# Patient Record
Sex: FEMALE | Race: WHITE | Employment: FULL TIME | URBAN - METROPOLITAN AREA
[De-identification: names, ages, dates, MRNs, and addresses within clinical notes are randomized per-mention and may not be internally consistent; named-entity substitution may affect disease eponyms.]

---

## 2017-01-20 ENCOUNTER — OFFICE VISIT (OUTPATIENT)
Dept: OPTOMETRY | Facility: CLINIC | Age: 52
End: 2017-01-20

## 2017-01-20 DIAGNOSIS — H52.13 MYOPIA, BILATERAL: Primary | ICD-10-CM

## 2017-01-20 PROCEDURE — 92015 DETERMINE REFRACTIVE STATE: CPT | Performed by: OPTOMETRIST

## 2017-01-20 PROCEDURE — 92012 INTRM OPH EXAM EST PATIENT: CPT | Performed by: OPTOMETRIST

## 2017-01-20 NOTE — PROGRESS NOTES
Neeta Walls is a 47year old female. HPI:     HPI     Patient is in for an annual contact lens exam.Patient had issues with red irritated eyes while wearing contacts and sleeping in them in November of last year.  Stopped overwear and redness and i Pupils      Pupils   Right PERRL   Left PERRL         Visual Fields      Left Right   Result Full Full         Extraocular Movement      Right Left   Result Full, Ortho Full, Ortho         Neuro/Psych     Oriented x3:  Yes    Mood/Affect:  Normal 180   Left Acuvue Oasys for Astigmatism 8.60 14.5 -5.00 -1.75 180       Expiration Date:  1/21/2018                 ASSESSMENT/PLAN:     Diagnoses and Plan:     Myopia  New glasses and contact lens RX given today.       No orders of the defined types were p

## 2017-03-27 ENCOUNTER — NURSE NAVIGATOR ENCOUNTER (OUTPATIENT)
Dept: HEMATOLOGY/ONCOLOGY | Facility: HOSPITAL | Age: 52
End: 2017-03-27

## 2017-03-27 NOTE — PROGRESS NOTES
Navigator returned missed phone call from patient. Navigator discussed November 2016 attempt at stereotactic biopsy and recommendations for follow-up with Dr. Lexi Monroe and/or 6 month follow-up imaging (Left Diagnostic Mammo).   Patient stated she wishes to f

## 2017-03-28 ENCOUNTER — TELEPHONE (OUTPATIENT)
Dept: INTERNAL MEDICINE CLINIC | Facility: CLINIC | Age: 52
End: 2017-03-28

## 2017-03-28 DIAGNOSIS — R92.8 ABNORMAL MAMMOGRAM: Primary | ICD-10-CM

## 2017-03-28 NOTE — TELEPHONE ENCOUNTER
----- Message from Sabrina Gtz RN sent at 3/27/2017  8:13 AM CDT -----  Good morning,     Your patient, Vangie Goldberg, was recommended for a surgical consultation and follow-up diagnostic imaging from her November 2016 breast imaging and unsuccessful biop

## 2017-03-31 NOTE — TELEPHONE ENCOUNTER
Talked to Jeronimo Pizano in the breast center. Order placed. We do not need a referral for surgical consultation for patient is PPO. Jeronimo Pizano stated will contact the patient.

## 2017-05-15 ENCOUNTER — NURSE NAVIGATOR ENCOUNTER (OUTPATIENT)
Dept: HEMATOLOGY/ONCOLOGY | Facility: HOSPITAL | Age: 52
End: 2017-05-15

## 2017-05-15 NOTE — PROGRESS NOTES
Message left on machine for patient to contact Navigator.   (In regards to patient's order from Dr. Praveen Fernandez for 6 month follow-up imaging.)

## 2017-05-16 ENCOUNTER — NURSE NAVIGATOR ENCOUNTER (OUTPATIENT)
Dept: HEMATOLOGY/ONCOLOGY | Facility: HOSPITAL | Age: 52
End: 2017-05-16

## 2017-05-16 NOTE — PROGRESS NOTES
Patient called Navigator back and left message. Navigator returning patient's phone call. Discussed Dr. Ashley Camara order for 6 month follow-up mammogram.  Patient wishes to schedule appointment today.   Patient transferred to Central Scheduling to make imag

## 2017-05-19 ENCOUNTER — HOSPITAL ENCOUNTER (OUTPATIENT)
Dept: MAMMOGRAPHY | Facility: HOSPITAL | Age: 52
Discharge: HOME | End: 2017-05-19
Attending: INTERNAL MEDICINE
Payer: COMMERCIAL

## 2017-05-19 DIAGNOSIS — R92.8 ABNORMAL MAMMOGRAM: ICD-10-CM

## 2017-05-19 PROCEDURE — 77065 DX MAMMO INCL CAD UNI: CPT | Performed by: INTERNAL MEDICINE

## 2017-05-22 ENCOUNTER — NURSE NAVIGATOR ENCOUNTER (OUTPATIENT)
Dept: HEMATOLOGY/ONCOLOGY | Facility: HOSPITAL | Age: 52
End: 2017-05-22

## 2017-05-24 ENCOUNTER — OFFICE VISIT (OUTPATIENT)
Dept: OBGYN CLINIC | Facility: CLINIC | Age: 52
End: 2017-05-24

## 2017-05-24 VITALS
SYSTOLIC BLOOD PRESSURE: 103 MMHG | BODY MASS INDEX: 21.44 KG/M2 | DIASTOLIC BLOOD PRESSURE: 69 MMHG | HEIGHT: 66.5 IN | WEIGHT: 135 LBS | HEART RATE: 70 BPM

## 2017-05-24 DIAGNOSIS — R68.82 DECREASED LIBIDO: ICD-10-CM

## 2017-05-24 DIAGNOSIS — N94.10 DYSPAREUNIA, FEMALE: ICD-10-CM

## 2017-05-24 DIAGNOSIS — Z01.419 ENCOUNTER FOR GYNECOLOGICAL EXAMINATION WITHOUT ABNORMAL FINDING: Primary | ICD-10-CM

## 2017-05-24 DIAGNOSIS — N89.8 VAGINAL DISCHARGE: ICD-10-CM

## 2017-05-24 PROCEDURE — 99396 PREV VISIT EST AGE 40-64: CPT | Performed by: OBSTETRICS & GYNECOLOGY

## 2017-05-24 PROCEDURE — 99213 OFFICE O/P EST LOW 20 MIN: CPT | Performed by: OBSTETRICS & GYNECOLOGY

## 2017-05-24 NOTE — PROGRESS NOTES
Sharon Benavides is a 47year old female [id number] Patient's last menstrual period was 11/25/2014 (exact date).  Patient presents with:  Gyn Exam: ANNUAL EXAM / VAGINAL DISCHARGE -- last seen 12 yrs ago w/ daughter's pregnancy -- states takes appt w/ whomeve Problem Relation Age of Onset   • Heart Disorder Father    • Heart Disorder Mother    • Heart Disease Mother      per NG: cad   • Diabetes Mother    • Heart Disease Maternal Grandfather 36     per NG: CAD   • Glaucoma Neg    • Breast Cancer Maternal Aunt without lesions, (+) yellow discharge  Cervix:  Normal without tenderness on motion  Uterus: normal in size, contour, position, mobility, without tenderness  Adnexa: normal without masses or tenderness  Perineum: normal  Anus: no hemorroids     Assessment

## 2017-05-24 NOTE — PROGRESS NOTES
Patient called Navigator back. Pt requesting to make appointment with Dr. Miley Pradhan. Coordinated/confirmed appointment with Dr. Miley Pradhan on 6/2/17 at 1215pm.  Pt acknowledged, Dr. Rossi Diaz address given to patient.   Pt encouraged to call Navigator for any fut

## 2017-06-16 ENCOUNTER — OFFICE VISIT (OUTPATIENT)
Dept: SURGERY | Facility: CLINIC | Age: 52
End: 2017-06-16

## 2017-06-16 VITALS
WEIGHT: 131.19 LBS | SYSTOLIC BLOOD PRESSURE: 93 MMHG | DIASTOLIC BLOOD PRESSURE: 60 MMHG | HEART RATE: 73 BPM | RESPIRATION RATE: 18 BRPM | TEMPERATURE: 97 F | BODY MASS INDEX: 21 KG/M2 | OXYGEN SATURATION: 98 %

## 2017-06-16 DIAGNOSIS — Z01.818 PRE-OP TESTING: ICD-10-CM

## 2017-06-16 DIAGNOSIS — R92.1 CALCIFICATION OF LEFT BREAST: Primary | ICD-10-CM

## 2017-06-16 PROCEDURE — 99205 OFFICE O/P NEW HI 60 MIN: CPT | Performed by: SURGERY

## 2017-06-16 NOTE — PROGRESS NOTES
Pt given pre-op instructions for surgery. All questions answered. Surgery scheduler will call patient to schedule surgery at [facility]. Pt agreed and understood.        Education Record    Learner:  Patient    Disease / Diagnosis:     Adilia / Willa Dawkins

## 2017-06-19 NOTE — PROGRESS NOTES
Breast Surgery New Patient Consultation    This is the first visit for this 47year old woman, referred by Dr. Sharyle Mealing, who presents for evaluation of abnormal imaging.     History of Present Illness:   Ms. Sharon Benavides is a 47year old woman who presen current outpatient prescriptions on file prior to visit. No current facility-administered medications on file prior to visit.     Allergies:    No Known Allergies    Family History:   Family History   Problem Relation Age of Onset   • Heart Disorder Father swallowing, reflux symptoms, vomiting, dark or bloody stools, constipation, yellowing of the skin, indigestion, nausea, change in bowel habits, diarrhea, abdominal pain or vomiting blood.      Genitourinary:  The patient denies frequent urination, needing t masses. The trachea is in the midline. Conjunctiva are clear, non-icteric. Chest: The chest expands symmetrically. The lungs are clear to auscultation. Heart: The rhythm is regular. There are no murmurs, rubs, gallops or thrills.     Breasts:  Her br procedure were explained to the patient and she understood and agreed to the proposed plan. She was given ample opportunity for questions and those questions were answered to her satisfaction.  I explained that the subsequent plan will be dictated by the collins

## 2017-07-12 ENCOUNTER — TELEPHONE (OUTPATIENT)
Dept: OPTOMETRY | Facility: CLINIC | Age: 52
End: 2017-07-12

## 2017-07-24 ENCOUNTER — HOSPITAL ENCOUNTER (OUTPATIENT)
Dept: MAMMOGRAPHY | Facility: HOSPITAL | Age: 52
Discharge: HOME | End: 2017-07-24
Attending: SURGERY
Payer: COMMERCIAL

## 2017-07-24 DIAGNOSIS — R92.0 ABNORMAL FINDING ON MAMMOGRAPHY, MICROCALCIFICATION: ICD-10-CM

## 2017-07-24 NOTE — IMAGING NOTE
Note cancelled as pt was incorrectly placed in the rad schedule. Wrong date. ..should have been on the 08/24 schedule

## 2017-08-15 ENCOUNTER — TELEPHONE (OUTPATIENT)
Dept: OBGYN CLINIC | Facility: CLINIC | Age: 52
End: 2017-08-15

## 2017-08-15 DIAGNOSIS — R30.0 BURNING WITH URINATION: Primary | ICD-10-CM

## 2017-08-15 NOTE — TELEPHONE ENCOUNTER
Pt reports burning over the summer that comes and goes. Pt's father-in-law prescribed an antibiotic that improved burning but did not clear it completely. Pt has not done UA for symptoms. Pt cannot state if burning is from urethra or vagina.  Pt denies urin

## 2017-08-17 ENCOUNTER — TELEPHONE (OUTPATIENT)
Dept: OBGYN CLINIC | Facility: CLINIC | Age: 52
End: 2017-08-17

## 2017-08-17 ENCOUNTER — OFFICE VISIT (OUTPATIENT)
Dept: OBGYN CLINIC | Facility: CLINIC | Age: 52
End: 2017-08-17

## 2017-08-17 VITALS
HEART RATE: 82 BPM | BODY MASS INDEX: 21 KG/M2 | WEIGHT: 132 LBS | DIASTOLIC BLOOD PRESSURE: 59 MMHG | SYSTOLIC BLOOD PRESSURE: 93 MMHG

## 2017-08-17 DIAGNOSIS — R68.82 DECREASED LIBIDO: ICD-10-CM

## 2017-08-17 DIAGNOSIS — N94.10 DYSPAREUNIA, FEMALE: Primary | ICD-10-CM

## 2017-08-17 PROCEDURE — 99213 OFFICE O/P EST LOW 20 MIN: CPT | Performed by: OBSTETRICS & GYNECOLOGY

## 2017-08-17 RX ORDER — ESTRADIOL 0.1 MG/G
1 CREAM VAGINAL DAILY
Qty: 42.5 G | Refills: 0 | Status: SHIPPED | OUTPATIENT
Start: 2017-08-17 | End: 2018-04-25

## 2017-08-17 NOTE — PROGRESS NOTES
Heather Meraz is a 47year old female [id number] Patient's last menstrual period was 11/25/2014 (exact date). Patient presents with:  Gyn Problem: F/UP VISIT-- wishes to use vaginal estrogen for dyspareunia -- saw Dr. Bustamante Me & told no issues.  Wants rx for denies back pain. Neurological:  denies headaches, extremity weakness or numbness. Psychiatric: denies depression or anxiety.         PHYSICAL EXAM:   Constitutional: well developed, well nourished  Head/Face: normocephalic  Psychiatric:  Oriented to time

## 2017-08-17 NOTE — TELEPHONE ENCOUNTER
Order for estradiol states \"insert 1 gm vaginally daily\" and use \"4/2 applicatorful\" daily for 2 weeks and then on Mondays and Thursdays.  Pharmacy asking for clarification on dose

## 2017-08-21 ENCOUNTER — TELEPHONE (OUTPATIENT)
Dept: OPTOMETRY | Facility: CLINIC | Age: 52
End: 2017-08-21

## 2017-08-22 NOTE — TELEPHONE ENCOUNTER
Patient wants to try gpcl's. She had as a teen but were too uncomfortable. She has monovision toric soft lenses and just does not see as well as she would like. Vision is incnsistent. I advised will trial fit .  I will look for an appointment earlier than O

## 2017-08-24 ENCOUNTER — APPOINTMENT (OUTPATIENT)
Dept: MAMMOGRAPHY | Facility: HOSPITAL | Age: 52
End: 2017-08-24
Attending: SURGERY
Payer: COMMERCIAL

## 2017-08-24 ENCOUNTER — SURGERY (OUTPATIENT)
Age: 52
End: 2017-08-24

## 2017-08-24 ENCOUNTER — HOSPITAL ENCOUNTER (OUTPATIENT)
Facility: HOSPITAL | Age: 52
Setting detail: HOSPITAL OUTPATIENT SURGERY
Discharge: HOME | End: 2017-08-24
Attending: SURGERY | Admitting: SURGERY
Payer: COMMERCIAL

## 2017-08-24 ENCOUNTER — ANESTHESIA EVENT (OUTPATIENT)
Dept: SURGERY | Facility: HOSPITAL | Age: 52
End: 2017-08-24
Payer: COMMERCIAL

## 2017-08-24 ENCOUNTER — HOSPITAL ENCOUNTER (OUTPATIENT)
Dept: MAMMOGRAPHY | Facility: HOSPITAL | Age: 52
Discharge: HOME | End: 2017-08-24
Attending: SURGERY
Payer: COMMERCIAL

## 2017-08-24 ENCOUNTER — ANESTHESIA (OUTPATIENT)
Dept: SURGERY | Facility: HOSPITAL | Age: 52
End: 2017-08-24
Payer: COMMERCIAL

## 2017-08-24 VITALS
RESPIRATION RATE: 16 BRPM | OXYGEN SATURATION: 99 % | HEIGHT: 66 IN | SYSTOLIC BLOOD PRESSURE: 95 MMHG | DIASTOLIC BLOOD PRESSURE: 51 MMHG | BODY MASS INDEX: 21.05 KG/M2 | HEART RATE: 71 BPM | TEMPERATURE: 98 F | WEIGHT: 131 LBS

## 2017-08-24 DIAGNOSIS — R92.1 CALCIFICATION OF LEFT BREAST: Primary | ICD-10-CM

## 2017-08-24 PROCEDURE — 0HBU0ZX EXCISION OF LEFT BREAST, OPEN APPROACH, DIAGNOSTIC: ICD-10-PCS | Performed by: SURGERY

## 2017-08-24 PROCEDURE — 76098 X-RAY EXAM SURGICAL SPECIMEN: CPT | Performed by: SURGERY

## 2017-08-24 PROCEDURE — 19281 PERQ DEVICE BREAST 1ST IMAG: CPT | Performed by: SURGERY

## 2017-08-24 PROCEDURE — 88307 TISSUE EXAM BY PATHOLOGIST: CPT | Performed by: SURGERY

## 2017-08-24 RX ORDER — SODIUM CHLORIDE, SODIUM LACTATE, POTASSIUM CHLORIDE, CALCIUM CHLORIDE 600; 310; 30; 20 MG/100ML; MG/100ML; MG/100ML; MG/100ML
INJECTION, SOLUTION INTRAVENOUS CONTINUOUS
Status: DISCONTINUED | OUTPATIENT
Start: 2017-08-24 | End: 2017-08-24

## 2017-08-24 RX ORDER — HYDROCODONE BITARTRATE AND ACETAMINOPHEN 5; 325 MG/1; MG/1
1-2 TABLET ORAL EVERY 6 HOURS PRN
Qty: 20 TABLET | Refills: 0 | Status: SHIPPED | OUTPATIENT
Start: 2017-08-24 | End: 2017-08-30 | Stop reason: ALTCHOICE

## 2017-08-24 RX ORDER — MORPHINE SULFATE 4 MG/ML
6 INJECTION, SOLUTION INTRAMUSCULAR; INTRAVENOUS EVERY 10 MIN PRN
Status: DISCONTINUED | OUTPATIENT
Start: 2017-08-24 | End: 2017-08-24

## 2017-08-24 RX ORDER — LIDOCAINE HYDROCHLORIDE 10 MG/ML
INJECTION, SOLUTION EPIDURAL; INFILTRATION; INTRACAUDAL; PERINEURAL AS NEEDED
Status: DISCONTINUED | OUTPATIENT
Start: 2017-08-24 | End: 2017-08-24 | Stop reason: SURG

## 2017-08-24 RX ORDER — HYDROMORPHONE HYDROCHLORIDE 1 MG/ML
0.2 INJECTION, SOLUTION INTRAMUSCULAR; INTRAVENOUS; SUBCUTANEOUS EVERY 5 MIN PRN
Status: DISCONTINUED | OUTPATIENT
Start: 2017-08-24 | End: 2017-08-24

## 2017-08-24 RX ORDER — MORPHINE SULFATE 2 MG/ML
2 INJECTION, SOLUTION INTRAMUSCULAR; INTRAVENOUS EVERY 10 MIN PRN
Status: DISCONTINUED | OUTPATIENT
Start: 2017-08-24 | End: 2017-08-24

## 2017-08-24 RX ORDER — HYDROMORPHONE HYDROCHLORIDE 1 MG/ML
0.6 INJECTION, SOLUTION INTRAMUSCULAR; INTRAVENOUS; SUBCUTANEOUS EVERY 5 MIN PRN
Status: DISCONTINUED | OUTPATIENT
Start: 2017-08-24 | End: 2017-08-24

## 2017-08-24 RX ORDER — LIDOCAINE HYDROCHLORIDE AND EPINEPHRINE 10; 10 MG/ML; UG/ML
INJECTION, SOLUTION INFILTRATION; PERINEURAL AS NEEDED
Status: DISCONTINUED | OUTPATIENT
Start: 2017-08-24 | End: 2017-08-24 | Stop reason: HOSPADM

## 2017-08-24 RX ORDER — HALOPERIDOL 5 MG/ML
0.25 INJECTION INTRAMUSCULAR ONCE AS NEEDED
Status: DISCONTINUED | OUTPATIENT
Start: 2017-08-24 | End: 2017-08-24

## 2017-08-24 RX ORDER — METOCLOPRAMIDE 10 MG/1
10 TABLET ORAL ONCE
Status: DISCONTINUED | OUTPATIENT
Start: 2017-08-24 | End: 2017-08-24 | Stop reason: HOSPADM

## 2017-08-24 RX ORDER — MIDAZOLAM HYDROCHLORIDE 1 MG/ML
INJECTION INTRAMUSCULAR; INTRAVENOUS AS NEEDED
Status: DISCONTINUED | OUTPATIENT
Start: 2017-08-24 | End: 2017-08-24 | Stop reason: SURG

## 2017-08-24 RX ORDER — ACETAMINOPHEN 325 MG/1
650 TABLET ORAL ONCE
Status: COMPLETED | OUTPATIENT
Start: 2017-08-24 | End: 2017-08-24

## 2017-08-24 RX ORDER — MORPHINE SULFATE 4 MG/ML
4 INJECTION, SOLUTION INTRAMUSCULAR; INTRAVENOUS EVERY 10 MIN PRN
Status: DISCONTINUED | OUTPATIENT
Start: 2017-08-24 | End: 2017-08-24

## 2017-08-24 RX ORDER — HYDROMORPHONE HYDROCHLORIDE 1 MG/ML
0.4 INJECTION, SOLUTION INTRAMUSCULAR; INTRAVENOUS; SUBCUTANEOUS EVERY 5 MIN PRN
Status: DISCONTINUED | OUTPATIENT
Start: 2017-08-24 | End: 2017-08-24

## 2017-08-24 RX ORDER — NALOXONE HYDROCHLORIDE 0.4 MG/ML
80 INJECTION, SOLUTION INTRAMUSCULAR; INTRAVENOUS; SUBCUTANEOUS AS NEEDED
Status: DISCONTINUED | OUTPATIENT
Start: 2017-08-24 | End: 2017-08-24

## 2017-08-24 RX ORDER — ONDANSETRON 2 MG/ML
4 INJECTION INTRAMUSCULAR; INTRAVENOUS ONCE AS NEEDED
Status: DISCONTINUED | OUTPATIENT
Start: 2017-08-24 | End: 2017-08-24

## 2017-08-24 RX ORDER — FAMOTIDINE 20 MG/1
20 TABLET ORAL ONCE
Status: DISCONTINUED | OUTPATIENT
Start: 2017-08-24 | End: 2017-08-24 | Stop reason: HOSPADM

## 2017-08-24 RX ORDER — HYDROCODONE BITARTRATE AND ACETAMINOPHEN 5; 325 MG/1; MG/1
2 TABLET ORAL AS NEEDED
Status: DISCONTINUED | OUTPATIENT
Start: 2017-08-24 | End: 2017-08-24

## 2017-08-24 RX ORDER — HYDROCODONE BITARTRATE AND ACETAMINOPHEN 5; 325 MG/1; MG/1
1 TABLET ORAL AS NEEDED
Status: DISCONTINUED | OUTPATIENT
Start: 2017-08-24 | End: 2017-08-24

## 2017-08-24 RX ADMIN — SODIUM CHLORIDE, SODIUM LACTATE, POTASSIUM CHLORIDE, CALCIUM CHLORIDE: 600; 310; 30; 20 INJECTION, SOLUTION INTRAVENOUS at 11:55:00

## 2017-08-24 RX ADMIN — LIDOCAINE HYDROCHLORIDE 50 MG: 10 INJECTION, SOLUTION EPIDURAL; INFILTRATION; INTRACAUDAL; PERINEURAL at 11:59:00

## 2017-08-24 RX ADMIN — SODIUM CHLORIDE, SODIUM LACTATE, POTASSIUM CHLORIDE, CALCIUM CHLORIDE: 600; 310; 30; 20 INJECTION, SOLUTION INTRAVENOUS at 12:30:00

## 2017-08-24 RX ADMIN — MIDAZOLAM HYDROCHLORIDE 2 MG: 1 INJECTION INTRAMUSCULAR; INTRAVENOUS at 11:55:00

## 2017-08-24 NOTE — ANESTHESIA POSTPROCEDURE EVALUATION
Patient: Buster Osler    Procedure Summary     Date:  08/24/17 Room / Location:  [address] [place] [address] OR 09 / 300 [facility] OR    Anesthesia Start:  2489 Anesthesia Stop:      Procedure:  BREAST BIOPSY NEEDLE LOCALIZATION (Left ) Diagnosis:  (Micro-calcifications of l

## 2017-08-24 NOTE — ANESTHESIA PREPROCEDURE EVALUATION
Anesthesia PreOp Note    HPI:     Obdulio Malik is a 47year old female who presents for preoperative consultation requested by: Shiva Frost MD    Date of Surgery: 8/24/2017    Procedure(s):  BREAST BIOPSY NEEDLE LOCALIZATION  Indication: Micro- Disease Maternal Grandfather 36     per NG: CAD   • Breast Cancer Maternal Aunt      76s   • Breast Cancer Maternal Cousin Female      35s   • Glaucoma Neg        Social History  Social History   Marital status:   Spouse name: N/A    Years of educat surgeon  Provider Attestation (if preop done by other):  MAC possible GA,PONV,dental damages etc      I have informed Heather Meraz  of the nature of the anesthetic plan, benefits, risks, major complications, and any alternative forms of anesthetic man

## 2017-08-24 NOTE — BRIEF OP NOTE
Pre-Operative Diagnosis: Micro-calcifications of left breast     Post-Operative Diagnosis: * No post-op diagnosis entered *     Procedure Performed:   Procedure(s):  Left breast wire localized excisional biopsy    Surgeon(s) and Role:     Nirmal Botello

## 2017-08-24 NOTE — H&P
History of Present Illness:   Ms. Sara Leyva is a 47year old woman who presents with a left imaging detected breast mass/calcifications. She was referred for diagnostic imaging which is detailed below.  She denies any palpable masses, nipple dischar Known Allergies     Family History:          Family History   Problem Relation Age of Onset   • Heart Disorder Father     • Heart Disorder Mother     • Heart Disease Mother         per NG: cad   • Diabetes Mother     • Heart Disease Maternal Grandfather 36 indigestion, nausea, change in bowel habits, diarrhea, abdominal pain or vomiting blood.      Genitourinary:  The patient denies frequent urination, needing to get up at night to urinate, urinary hesitancy or retaining urine, painful urination, urinary inc chest expands symmetrically. The lungs are clear to auscultation.     Heart: The rhythm is regular. There are no murmurs, rubs, gallops or thrills.     Breasts:  Her breasts are symmetrical with a cup size A.   Right breast: The skin, nipple ,and areola ap proposed plan. She was given ample opportunity for questions and those questions were answered to her satisfaction. I explained that the subsequent plan will be dictated by the surgical pathology.  She has been encouraged to contact the office with any ques

## 2017-08-24 NOTE — IMAGING NOTE
PT TO MAMMO DEPT SCOUTS COMPLETED by jonathan babb at [address] BY ALL STAFF MEMBERS CONSENT OBTAINED BY PREOP NURSE/CONSENT CHECKED  1000CC LACTATED RINGERS INFUSING TKO TO R HAND      [facility]

## 2017-08-25 ENCOUNTER — TELEPHONE (OUTPATIENT)
Dept: SURGERY | Facility: CLINIC | Age: 52
End: 2017-08-25

## 2017-08-25 NOTE — TELEPHONE ENCOUNTER
Left message asking patient to call me back to let me know how she is doing after her procedure. Also let her know the pathology came back benign. To call back with any questions, and see her at the follow up visit.

## 2017-08-28 NOTE — OPERATIVE REPORT
[facility]    PATIENT'S NAME: Pranay LEMUS   ATTENDING PHYSICIAN: Giana Castro. Sofia Shone, MD   OPERATING PHYSICIAN: Giana Castro.  Sofia Shone, MD   PATIENT ACCOUNT#:   [id number]    LOCATION:  [address]  MEDICAL RECORD #:   [id number] for DVT prophylaxis. Monitored anesthesia care was induced. The left breast was prepped and draped in usual sterile fashion. Lidocaine 1% with epinephrine was then used to infiltrate the skin and subcutaneous tissues at the incision site.   A curvilinear

## 2017-08-30 ENCOUNTER — OFFICE VISIT (OUTPATIENT)
Dept: SURGERY | Facility: CLINIC | Age: 52
End: 2017-08-30

## 2017-08-30 VITALS
RESPIRATION RATE: 18 BRPM | HEART RATE: 70 BPM | TEMPERATURE: 99 F | HEIGHT: 66 IN | OXYGEN SATURATION: 100 % | SYSTOLIC BLOOD PRESSURE: 93 MMHG | BODY MASS INDEX: 21.21 KG/M2 | DIASTOLIC BLOOD PRESSURE: 68 MMHG | WEIGHT: 132 LBS

## 2017-08-30 DIAGNOSIS — R92.1 BREAST CALCIFICATION, LEFT: Primary | ICD-10-CM

## 2017-08-30 PROCEDURE — 99024 POSTOP FOLLOW-UP VISIT: CPT | Performed by: SURGERY

## 2017-10-03 ENCOUNTER — OFFICE VISIT (OUTPATIENT)
Dept: OPTOMETRY | Facility: CLINIC | Age: 52
End: 2017-10-03

## 2017-10-03 DIAGNOSIS — H52.13 MYOPIA OF BOTH EYES: Primary | ICD-10-CM

## 2017-10-03 PROCEDURE — 99211 OFF/OP EST MAY X REQ PHY/QHP: CPT | Performed by: OPTOMETRIST

## 2017-10-03 NOTE — ASSESSMENT & PLAN NOTE
[facility] If not happy with vision will consider LASIK with Dr. Tiffanie Gilliam.

## 2017-10-03 NOTE — PROGRESS NOTES
Suma Avelar is a 47year old female. HPI:     HPI     Patient wants to try gas permeable lenses. Has had soft toric lenses in the monovision technique and vision is not as clear as she would like.  Between minor rotations of axis and monovision fidel 44.75    Left 43.00 44.75            Slit Lamp and Fundus Exam     Slit Lamp Exam       Right Left    Lids/Lashes Normal Normal    Conjunctiva/Sclera Normal Normal    Cornea Clear Clear    Anterior Chamber Deep and quiet Deep and quiet            Refractio

## 2017-10-03 NOTE — PATIENT INSTRUCTIONS
Myopia  Order trials in Proclear toric. If not happy with vision will consider LASIK with Dr. Kelsey Driscoll.

## 2017-10-30 ENCOUNTER — LAB ENCOUNTER (OUTPATIENT)
Dept: LAB | Facility: HOSPITAL | Age: 52
End: 2017-10-30
Attending: OBSTETRICS & GYNECOLOGY
Payer: COMMERCIAL

## 2017-10-30 ENCOUNTER — TELEPHONE (OUTPATIENT)
Dept: OBGYN CLINIC | Facility: CLINIC | Age: 52
End: 2017-10-30

## 2017-10-30 DIAGNOSIS — R35.0 URINARY FREQUENCY: Primary | ICD-10-CM

## 2017-10-30 DIAGNOSIS — R35.0 URINARY FREQUENCY: ICD-10-CM

## 2017-10-30 DIAGNOSIS — R30.9 PAIN WITH URINATION: ICD-10-CM

## 2017-10-30 PROCEDURE — 87086 URINE CULTURE/COLONY COUNT: CPT

## 2017-10-30 PROCEDURE — 87186 SC STD MICRODIL/AGAR DIL: CPT

## 2017-10-30 PROCEDURE — 81001 URINALYSIS AUTO W/SCOPE: CPT

## 2017-10-30 PROCEDURE — 87088 URINE BACTERIA CULTURE: CPT

## 2017-10-30 NOTE — TELEPHONE ENCOUNTER
C/O URINARY FREQUENCY WITH A LOT OF PAIN AT THE END OF THE STREAM.  SYMPTOMS FOR FEW DAYS NOW. ASKED PT TO COME FOR UA,  ORDER PLACED. PHARMACY AND ALLERGIES CONFIRMED. PT WILL CALL TOMORROW AM FOR THE RESULT.

## 2017-10-30 NOTE — TELEPHONE ENCOUNTER
Per the pt she is having symptoms of a bladder infection, and would like to speak with a nurse. Please advise.

## 2017-10-31 RX ORDER — SULFAMETHOXAZOLE AND TRIMETHOPRIM 800; 160 MG/1; MG/1
1 TABLET ORAL 2 TIMES DAILY
Qty: 6 TABLET | Refills: 0 | Status: SHIPPED | OUTPATIENT
Start: 2017-10-31 | End: 2017-11-03

## 2017-10-31 NOTE — TELEPHONE ENCOUNTER
Pt calling for UA results. + for infection, being cultured now. Routed to [address] to please review and advise.

## 2017-11-02 ENCOUNTER — TELEPHONE (OUTPATIENT)
Dept: OBGYN CLINIC | Facility: CLINIC | Age: 52
End: 2017-11-02

## 2017-11-02 NOTE — TELEPHONE ENCOUNTER
Pt on medication for bladder infection. Pt has been taking the medication for 3 days now and was  feeling a bit better. Pt is experiencing burning inside the vagina all the time and wants to know if its cause of the medication.

## 2017-11-02 NOTE — TELEPHONE ENCOUNTER
Pt states that she has one more pill of bactrim left and is now c/o constant vaginal burning. Pt does not believe this is a yeast infection. Pt states that she felt an immediate improvement in urinary symptoms after starting bactrim.  Pt states that she alva

## 2017-11-03 RX ORDER — LEVOFLOXACIN 500 MG/1
500 TABLET, FILM COATED ORAL DAILY
Qty: 5 TABLET | Refills: 0 | Status: SHIPPED | OUTPATIENT
Start: 2017-11-03 | End: 2017-11-08

## 2017-11-03 NOTE — TELEPHONE ENCOUNTER
I need urine culture to see if abx resistant to bactrim -- do mid stream urine culture.  Make sure not recently done

## 2017-11-03 NOTE — TELEPHONE ENCOUNTER
Pt had urine culture 10/30 which shows sensitivity to Bactrim. Routed to [facility].

## 2017-11-03 NOTE — TELEPHONE ENCOUNTER
Per the pt she just finished taking medication for a bladder infection and she is having some of the symptoms again. The pt would like to know if another round of medication can be sent in for her.   The pt states that a detailed v/m can be left at [phone]

## 2017-11-03 NOTE — TELEPHONE ENCOUNTER
NJG notified of message below and v/o received for pt to take Levofloxacin 500 mg daily x 5 days and pt to call us back if no relief of symptoms after completing this tx. Pt notified and verbalized understanding.

## 2017-11-03 NOTE — TELEPHONE ENCOUNTER
BEKAH stating a message will be sent to [address] [place] [address] and we will call her back as soon as we have a response. Pt had urine cx done 10/30 and does show sensitivity to Bactrim.

## 2017-12-06 ENCOUNTER — TELEPHONE (OUTPATIENT)
Dept: OPTOMETRY | Facility: CLINIC | Age: 52
End: 2017-12-06

## 2017-12-06 NOTE — TELEPHONE ENCOUNTER
Pt states a pair of trial contacts were going to be ordered after OV on 10/03, pt has not heard anything ever since. Pt requesting cb. Pls call thank you. Aware not in office today.

## 2017-12-11 ENCOUNTER — OPTICAL REFILL REQUEST (OUTPATIENT)
Dept: OPTOMETRY | Facility: CLINIC | Age: 52
End: 2017-12-11

## 2017-12-11 ENCOUNTER — TELEPHONE (OUTPATIENT)
Dept: OPTOMETRY | Facility: CLINIC | Age: 52
End: 2017-12-11

## 2017-12-21 ENCOUNTER — NURSE TRIAGE (OUTPATIENT)
Dept: OTHER | Age: 52
End: 2017-12-21

## 2017-12-21 NOTE — TELEPHONE ENCOUNTER
Action Requested: Summary for Provider     []  Critical Lab, Recommendations Needed  [x] Need Additional Advice  []   FYI    []   Need Orders  [x] Need Medications Sent to Pharmacy  []  Other     SUMMARY: pt asking for antibiotic for sinus congestion and h

## 2017-12-29 NOTE — PROGRESS NOTES
Breast Surgery Post-Operative Visit    Diagnosis: Left breast calcifications associated with benign fibrocystic changes status post surgical excision on August 24, 2017    Stage: N/A    Disease Status:  Surgical treatment complete, no further treatment pen menarche at age 16 and LMP     Age of Menopause: 56  Type: Natural  She denies any history of hormone replacement therapy  She has history of oral contraceptive use for unknown years, last in 1994.   She has recieved infertility treatment to achieve pregnan emphysema, chronic bronchitis, shortness of breath or abnormal sound when breathing. Cardiovascular:   There is no history of chest pain, chest pressure/discomfort, palpitations, irregular heartbeat, fainting or near-fainting, difficulty breathing when Patient Position: Sitting, Cuff Size: adult)   Pulse 70   Temp 98.6 °F (37 °C) (Oral)   Resp 18   Ht 1.676 m (5' 6\")   Wt 59.9 kg (132 lb)   LMP 11/25/2014 (Exact Date)   SpO2 100%   BMI 21.31 kg/m²     Physical Examination:   Examination shows that the s

## 2018-01-16 ENCOUNTER — TELEPHONE (OUTPATIENT)
Dept: OPTOMETRY | Facility: CLINIC | Age: 53
End: 2018-01-16

## 2018-01-16 NOTE — TELEPHONE ENCOUNTER
LM on machine that PPO does not cover contacts unless she has optical insurance. There is no other secondary insurance listed. LMTCB.

## 2018-03-20 ENCOUNTER — OFFICE VISIT (OUTPATIENT)
Dept: OBGYN CLINIC | Facility: CLINIC | Age: 53
End: 2018-03-20

## 2018-03-20 VITALS
HEART RATE: 76 BPM | WEIGHT: 133.19 LBS | SYSTOLIC BLOOD PRESSURE: 89 MMHG | BODY MASS INDEX: 22 KG/M2 | DIASTOLIC BLOOD PRESSURE: 63 MMHG

## 2018-03-20 DIAGNOSIS — R14.0 BLOATING: ICD-10-CM

## 2018-03-20 DIAGNOSIS — Z11.3 SCREEN FOR STD (SEXUALLY TRANSMITTED DISEASE): ICD-10-CM

## 2018-03-20 DIAGNOSIS — N89.8 VAGINAL DISCHARGE: Primary | ICD-10-CM

## 2018-03-20 PROCEDURE — 99213 OFFICE O/P EST LOW 20 MIN: CPT | Performed by: OBSTETRICS & GYNECOLOGY

## 2018-03-20 NOTE — PROGRESS NOTES
Maqrues Ortega is a 47year old female [id number] Patient's last menstrual period was 11/25/2014 (exact date). Patient presents with:  Gyn Problem: brown discharge with bloating    NJG pt. Having increased vaginal discharge.   In last week noticed brown di appearance without lesions, no abnormal discharge. No blood or brown discharge.   Vaginal mucosa with irriation  Cervix:  Normal without tenderness on motion  Uterus: normal in size, contour, position, mobility, without tenderness  Adnexa: normal without m

## 2018-03-22 LAB
C TRACH DNA SPEC QL NAA+PROBE: NEGATIVE
N GONORRHOEA DNA SPEC QL NAA+PROBE: NEGATIVE

## 2018-03-24 ENCOUNTER — HOSPITAL ENCOUNTER (OUTPATIENT)
Dept: ULTRASOUND IMAGING | Facility: HOSPITAL | Age: 53
Discharge: HOME | End: 2018-03-24
Attending: OBSTETRICS & GYNECOLOGY
Payer: COMMERCIAL

## 2018-03-24 DIAGNOSIS — R14.0 BLOATING: ICD-10-CM

## 2018-03-25 LAB
GENITAL VAGINOSIS SCREEN: NEGATIVE
TRICHOMONAS SCREEN: NEGATIVE

## 2018-03-26 ENCOUNTER — TELEPHONE (OUTPATIENT)
Dept: OBGYN CLINIC | Facility: CLINIC | Age: 53
End: 2018-03-26

## 2018-03-26 NOTE — TELEPHONE ENCOUNTER
----- Message from Stephanie Trejo MD sent at 3/25/2018  5:13 PM CDT -----  BV screen negative.   Negative gc/chlamydia/trichomonas

## 2018-03-26 NOTE — TELEPHONE ENCOUNTER
Informed pt of below, she verbalized understanding. Pt wants LATISHA to know she tried to schedule an appt for US here at [address] and it's going to cost her 300 out of pocket so she is going to go somewhere else for the [address].  Pt asking if she should get a trans abd

## 2018-04-13 ENCOUNTER — TELEPHONE (OUTPATIENT)
Dept: OBGYN CLINIC | Facility: CLINIC | Age: 53
End: 2018-04-13

## 2018-04-13 NOTE — TELEPHONE ENCOUNTER
Pt had US ordered by Lata Posey and she had it done at an outside facility d/t cost. Informed her we have not yet received it. She just had it done yesterday. She will call back Monday to see if we have the report.

## 2018-04-16 ENCOUNTER — TELEPHONE (OUTPATIENT)
Dept: OBGYN CLINIC | Facility: CLINIC | Age: 53
End: 2018-04-16

## 2018-04-16 NOTE — TELEPHONE ENCOUNTER
Pelvic US report dated 4/12/18 from [facility] placed on [address] desk for review.

## 2018-04-16 NOTE — TELEPHONE ENCOUNTER
NOTIFIED PT WE DID NOT RECEIVE ANY ULTRASOUND RESULTS YET.  SHE WILL CHECK WITH THEM. I GAVE HER THE FAX NUMBER SO MAYBE THEN CAN FAX THE RESULT RATHER THAN MAILING.

## 2018-04-17 NOTE — TELEPHONE ENCOUNTER
US was placed on LATISHA's desk for review yesterday. She has not yet reviewed it. Once she does, we will call the pt.

## 2018-04-17 NOTE — TELEPHONE ENCOUNTER
Notified pt of results per Shellie Monroe [phone] below. Pt asking what is next because she is still having the same issues. Asked pt if she can report the issues and she states she cannot at this time. Offered pt to call us back when she is able to discuss.  Pt requested an

## 2018-04-18 ENCOUNTER — TELEPHONE (OUTPATIENT)
Dept: OBGYN CLINIC | Facility: CLINIC | Age: 53
End: 2018-04-18

## 2018-04-18 RX ORDER — FLUCONAZOLE 150 MG/1
150 TABLET ORAL ONCE
Qty: 1 TABLET | Refills: 0 | Status: SHIPPED | OUTPATIENT
Start: 2018-04-18 | End: 2018-04-18

## 2018-04-18 NOTE — TELEPHONE ENCOUNTER
Pt calling to report that she thinks she has a yeast infection and would like medication. Pt stated that symptoms started 4 days---pt stated she has already tried monistat 3 and then monistat 1 with no relief. Pt reports itching and d/c.  Pt denies vaginal

## 2018-04-18 NOTE — TELEPHONE ENCOUNTER
Pt stated she had something come up and had to cancel appt with JLK this morning. Pt is rescheduled to see Shellie Monroe [phone] on 5/1. Pt informed that RX for diflucan will be sent to pharmacy. Pt verbalized understanding.

## 2018-04-18 NOTE — TELEPHONE ENCOUNTER
Don't understand why she missed appt this morning-- could have addressed this at visit  Diflucan 150mg x 1

## 2018-04-18 NOTE — TELEPHONE ENCOUNTER
Poss yeast infection, over the counter medication is helping just a bit, pt would like to speak to the nurse

## 2018-04-24 ENCOUNTER — TELEPHONE (OUTPATIENT)
Dept: OBGYN CLINIC | Facility: CLINIC | Age: 53
End: 2018-04-24

## 2018-04-24 NOTE — TELEPHONE ENCOUNTER
Pt still has burning and discomfort. She took Diflucan 5 days ago. Pt states she is going on one year with discharge issues. Pt states she saw [address] in October, saw Bimal Pat last, had several tests and an US done.  She now has brown discharge, [place] old blood mix

## 2018-04-25 ENCOUNTER — OFFICE VISIT (OUTPATIENT)
Dept: OBGYN CLINIC | Facility: CLINIC | Age: 53
End: 2018-04-25

## 2018-04-25 ENCOUNTER — TELEPHONE (OUTPATIENT)
Dept: OBGYN CLINIC | Facility: CLINIC | Age: 53
End: 2018-04-25

## 2018-04-25 VITALS
HEART RATE: 70 BPM | BODY MASS INDEX: 21 KG/M2 | SYSTOLIC BLOOD PRESSURE: 97 MMHG | WEIGHT: 133 LBS | DIASTOLIC BLOOD PRESSURE: 65 MMHG

## 2018-04-25 DIAGNOSIS — N95.0 PMB (POSTMENOPAUSAL BLEEDING): Primary | ICD-10-CM

## 2018-04-25 DIAGNOSIS — N95.0 POSTMENOPAUSAL BLEEDING: ICD-10-CM

## 2018-04-25 DIAGNOSIS — N89.8 LEUKORRHEA: ICD-10-CM

## 2018-04-25 PROCEDURE — 99213 OFFICE O/P EST LOW 20 MIN: CPT | Performed by: OBSTETRICS & GYNECOLOGY

## 2018-04-25 PROCEDURE — 58100 BIOPSY OF UTERUS LINING: CPT | Performed by: OBSTETRICS & GYNECOLOGY

## 2018-04-25 RX ORDER — ESTRADIOL 0.1 MG/G
0.5 CREAM VAGINAL NIGHTLY
Qty: 42.5 G | Refills: 0 | Status: SHIPPED | OUTPATIENT
Start: 2018-04-25 | End: 2019-08-20

## 2018-04-25 NOTE — PROGRESS NOTES
Buster Osler is a 47year old female  Patient's last menstrual period was 2014 (exact date). Patient presents with:  Gyn Problem: Brown Vaginal Discharge -- has been dealing w/ xs vaginal d/c for last year.  Bothersome. (+) vaginal burning pain, diarrhea or constipation  Genitourinary:    denies dysuria, incontinence, abnormal vaginal discharge, vaginal itching  Musculoskeletal:   denies back pain. Skin/Breast:    denies any breast pain, lumps, or discharge.    Neurological:    denies headac [facility]      Specimen Collected: 03/20/18  7:25 PM   Last Resulted: 03/25/18  2:21 PM             Assessment & Plan:    Radha Samantha was seen today for gyn problem.     Diagnoses and all orders for this visit:    PMB (postmenopausal bleeding)  -     SURGICA

## 2018-04-25 NOTE — PROCEDURES
Endometrial Biopsy     Pre-Procedure Care:   Consent was obtained. Procedure/risks were explained. Questions were answered. Correct patient was identified. Correct side and site were confirmed.       Birth control method(s) used: POSTMENOPAUSAL AGE 53

## 2018-04-25 NOTE — TELEPHONE ENCOUNTER
Received v/o from [facility] to cancel vag cx done today. Called ref lab and lab canceled.

## 2018-04-26 ENCOUNTER — TELEPHONE (OUTPATIENT)
Dept: OBGYN CLINIC | Facility: CLINIC | Age: 53
End: 2018-04-26

## 2018-04-26 NOTE — TELEPHONE ENCOUNTER
The pt states that she had testing done yesterday, and she is calling for the results. Please advise.

## 2018-04-26 NOTE — TELEPHONE ENCOUNTER
Pt calling for path results. Pt informed results benign. Routed to [address] to please review and any additional recs?

## 2018-08-07 ENCOUNTER — TELEPHONE (OUTPATIENT)
Dept: OPTOMETRY | Facility: CLINIC | Age: 53
End: 2018-08-07

## 2018-08-16 ENCOUNTER — TELEPHONE (OUTPATIENT)
Dept: INTERNAL MEDICINE CLINIC | Facility: CLINIC | Age: 53
End: 2018-08-16

## 2018-08-16 NOTE — TELEPHONE ENCOUNTER
Pt requesting orders for annual labs to be added to the chart. Pt scheduled px appt on 9/21 at 9am through [address] request.     Please call back with confirmation once added.

## 2018-08-29 NOTE — TELEPHONE ENCOUNTER
Pt sent follow up message through [employer] to confirm lab work was ordered. Please advise.

## 2018-08-29 NOTE — TELEPHONE ENCOUNTER
RYAN - please advise if you would be willing to generate labs for pt prior to upcoming px appt on 09/21.   LOV - 02/02/2016  Last comprehensive labs - 03/06/2013

## 2018-09-04 NOTE — TELEPHONE ENCOUNTER
Lab orders have been generated for physical, to be completed fasting but drink plenty of fluids. These orders are for [place].  If she wants to do them at [facility] need to be regenerated and printed out.

## 2018-09-04 NOTE — TELEPHONE ENCOUNTER
I called pt. To let her know about labs and then she said she has had some tingling and burning of her feet. She wondered about diabetes. I said that you were doing a CMP and that will show glucose.   Did you want to do anything else before she sees you o

## 2018-09-20 ENCOUNTER — LAB ENCOUNTER (OUTPATIENT)
Dept: LAB | Facility: HOSPITAL | Age: 53
End: 2018-09-20
Attending: INTERNAL MEDICINE
Payer: COMMERCIAL

## 2018-09-20 DIAGNOSIS — Z00.00 ROUTINE PHYSICAL EXAMINATION: ICD-10-CM

## 2018-09-20 LAB
ALBUMIN SERPL BCP-MCNC: 4 G/DL (ref 3.5–4.8)
ALBUMIN/GLOB SERPL: 1.8 {RATIO} (ref 1–2)
ALP SERPL-CCNC: 53 U/L (ref 32–100)
ALT SERPL-CCNC: 15 U/L (ref 14–54)
ANION GAP SERPL CALC-SCNC: 5 MMOL/L (ref 0–18)
AST SERPL-CCNC: 18 U/L (ref 15–41)
BASOPHILS # BLD: 0.1 K/UL (ref 0–0.2)
BASOPHILS NFR BLD: 1 %
BILIRUB SERPL-MCNC: 0.6 MG/DL (ref 0.3–1.2)
BILIRUB UR QL: NEGATIVE
BUN SERPL-MCNC: 12 MG/DL (ref 8–20)
BUN/CREAT SERPL: 13.2 (ref 10–20)
CALCIUM SERPL-MCNC: 9.4 MG/DL (ref 8.5–10.5)
CHLORIDE SERPL-SCNC: 106 MMOL/L (ref 95–110)
CHOLEST SERPL-MCNC: 215 MG/DL (ref 110–200)
CLARITY UR: CLEAR
CO2 SERPL-SCNC: 27 MMOL/L (ref 22–32)
COLOR UR: YELLOW
CREAT SERPL-MCNC: 0.91 MG/DL (ref 0.5–1.5)
EOSINOPHIL # BLD: 0.2 K/UL (ref 0–0.7)
EOSINOPHIL NFR BLD: 4 %
ERYTHROCYTE [DISTWIDTH] IN BLOOD BY AUTOMATED COUNT: 12.9 % (ref 11–15)
GLOBULIN PLAS-MCNC: 2.2 G/DL (ref 2.5–3.7)
GLUCOSE SERPL-MCNC: 93 MG/DL (ref 70–99)
GLUCOSE UR-MCNC: NEGATIVE MG/DL
HCT VFR BLD AUTO: 39.8 % (ref 35–48)
HDLC SERPL-MCNC: 56 MG/DL
HGB BLD-MCNC: 13.4 G/DL (ref 12–16)
HGB UR QL STRIP.AUTO: NEGATIVE
KETONES UR-MCNC: NEGATIVE MG/DL
LDLC SERPL CALC-MCNC: 144 MG/DL (ref 0–99)
LYMPHOCYTES # BLD: 2.6 K/UL (ref 1–4)
LYMPHOCYTES NFR BLD: 42 %
MCH RBC QN AUTO: 31.4 PG (ref 27–32)
MCHC RBC AUTO-ENTMCNC: 33.8 G/DL (ref 32–37)
MCV RBC AUTO: 92.8 FL (ref 80–100)
MONOCYTES # BLD: 0.4 K/UL (ref 0–1)
MONOCYTES NFR BLD: 6 %
NEUTROPHILS # BLD AUTO: 3 K/UL (ref 1.8–7.7)
NEUTROPHILS NFR BLD: 48 %
NITRITE UR QL STRIP.AUTO: NEGATIVE
NONHDLC SERPL-MCNC: 159 MG/DL
OSMOLALITY UR CALC.SUM OF ELEC: 285 MOSM/KG (ref 275–295)
PATIENT FASTING: YES
PH UR: 6 [PH] (ref 5–8)
PLATELET # BLD AUTO: 205 K/UL (ref 140–400)
PMV BLD AUTO: 9 FL (ref 7.4–10.3)
POTASSIUM SERPL-SCNC: 4.3 MMOL/L (ref 3.3–5.1)
PROT SERPL-MCNC: 6.2 G/DL (ref 5.9–8.4)
PROT UR-MCNC: NEGATIVE MG/DL
RBC # BLD AUTO: 4.29 M/UL (ref 3.7–5.4)
RBC #/AREA URNS AUTO: <1 /HPF
SODIUM SERPL-SCNC: 138 MMOL/L (ref 136–144)
SP GR UR STRIP: 1.01 (ref 1–1.03)
TRIGL SERPL-MCNC: 75 MG/DL (ref 1–149)
TSH SERPL-ACNC: 3.33 UIU/ML (ref 0.45–5.33)
UROBILINOGEN UR STRIP-ACNC: <2
VIT B12 SERPL-MCNC: 226 PG/ML (ref 181–914)
VIT C UR-MCNC: NEGATIVE MG/DL
WBC # BLD AUTO: 6.4 K/UL (ref 4–11)
WBC #/AREA URNS AUTO: 4 /HPF

## 2018-09-20 PROCEDURE — 82607 VITAMIN B-12: CPT

## 2018-09-20 PROCEDURE — 80053 COMPREHEN METABOLIC PANEL: CPT

## 2018-09-20 PROCEDURE — 82306 VITAMIN D 25 HYDROXY: CPT

## 2018-09-20 PROCEDURE — 80061 LIPID PANEL: CPT

## 2018-09-20 PROCEDURE — 85025 COMPLETE CBC W/AUTO DIFF WBC: CPT

## 2018-09-20 PROCEDURE — 81001 URINALYSIS AUTO W/SCOPE: CPT

## 2018-09-20 PROCEDURE — 36415 COLL VENOUS BLD VENIPUNCTURE: CPT

## 2018-09-20 PROCEDURE — 84443 ASSAY THYROID STIM HORMONE: CPT

## 2018-09-21 ENCOUNTER — OFFICE VISIT (OUTPATIENT)
Dept: INTERNAL MEDICINE CLINIC | Facility: CLINIC | Age: 53
End: 2018-09-21
Payer: COMMERCIAL

## 2018-09-21 VITALS
HEART RATE: 71 BPM | HEIGHT: 66 IN | DIASTOLIC BLOOD PRESSURE: 67 MMHG | SYSTOLIC BLOOD PRESSURE: 98 MMHG | WEIGHT: 132 LBS | BODY MASS INDEX: 21.21 KG/M2 | RESPIRATION RATE: 16 BRPM

## 2018-09-21 DIAGNOSIS — Z01.419 PAP TEST, AS PART OF ROUTINE GYNECOLOGICAL EXAMINATION: ICD-10-CM

## 2018-09-21 DIAGNOSIS — E78.5 HYPERLIPIDEMIA, UNSPECIFIED HYPERLIPIDEMIA TYPE: ICD-10-CM

## 2018-09-21 DIAGNOSIS — R92.2 DENSE BREAST TISSUE ON MAMMOGRAM: ICD-10-CM

## 2018-09-21 DIAGNOSIS — Z00.00 ROUTINE PHYSICAL EXAMINATION: Primary | ICD-10-CM

## 2018-09-21 DIAGNOSIS — Z23 NEED FOR VACCINATION: ICD-10-CM

## 2018-09-21 DIAGNOSIS — R92.1 BREAST CALCIFICATION SEEN ON MAMMOGRAM: ICD-10-CM

## 2018-09-21 DIAGNOSIS — R20.2 PARESTHESIA OF BOTH FEET: ICD-10-CM

## 2018-09-21 DIAGNOSIS — Z78.0 MENOPAUSE: ICD-10-CM

## 2018-09-21 LAB — 25(OH)D3 SERPL-MCNC: 28.8 NG/ML (ref 30–100)

## 2018-09-21 PROCEDURE — 99396 PREV VISIT EST AGE 40-64: CPT | Performed by: INTERNAL MEDICINE

## 2018-09-21 PROCEDURE — 90471 IMMUNIZATION ADMIN: CPT | Performed by: INTERNAL MEDICINE

## 2018-09-21 PROCEDURE — 90686 IIV4 VACC NO PRSV 0.5 ML IM: CPT | Performed by: INTERNAL MEDICINE

## 2018-09-21 RX ORDER — CLINDAMYCIN PHOSPHATE 20 MG/G
1 CREAM VAGINAL
COMMUNITY
Start: 2018-06-20 | End: 2019-08-20

## 2018-09-21 NOTE — PATIENT INSTRUCTIONS
Problem List Items Addressed This Visit        Unprioritized    Hyperlipidemia     Elevated cholesterol with borderline elevated sugars off and on.   Blood sugars at this test were normal.  She does have a family history of heart disease in her mother and h rectocele or uterine descent.   Pap completed  Immunizations-    Immunization History   Administered Date(s) Administered   • Influenza 11/11/2008, 11/01/2011, 09/28/2012   • TDAP 04/01/2014     Flu Shot due         Relevant Orders    COMP METABOLIC PANEL (

## 2018-09-21 NOTE — ASSESSMENT & PLAN NOTE
New onset burning, tingling bilateral feet. She has been taking over-the-counter vitamins and supplements which seems to have helped. Will recheck labs at this time .

## 2018-09-21 NOTE — ASSESSMENT & PLAN NOTE
Elevated cholesterol with borderline elevated sugars off and on. Blood sugars at this test were normal.  She does have a family history of heart disease in her mother and her maternal grandfather.   She is advised strict diet controlled to watch the fatty

## 2018-09-21 NOTE — ASSESSMENT & PLAN NOTE
Normal exam.  Labs recently completed discussed. Follow-up labs ordered.   Skin check normal.  Multiple scattered nevi present–advised to follow-up with dermatology for a screening–[phone] for an appointment with Dr. Addie Terry  Breast exam completed–no pal

## 2018-09-21 NOTE — PROGRESS NOTES
HPI:    Patient ID: Victor Manuel Zuniga is a 47year old female.     Physical/pap    Obstetric History     T2    L2    SAB0  TAB0  Ectopic0  Multiple0  Live Births2     Pap Smear,3 Years due on 2017  Colonoscopy due on 1966  Mammogram Every Day Smoker        Packs/day: 0.25        Years: 35.00        Pack years: 8.75        Types: Cigarettes      Smokeless tobacco: Never Used      Tobacco comment: was a 1ppd smoker,reduced to 1/4 ppd x 10 yrs     Alcohol use: No      Alcohol/week: 0.0 o rales. She exhibits no mass and no tenderness. Right breast exhibits no inverted nipple, no mass, no nipple discharge, no skin change and no tenderness.  Left breast exhibits no inverted nipple, no mass, no nipple discharge, no skin change and no tenderness This Visit        Unprioritized    Routine physical examination - Primary     Normal exam.  Labs recently completed discussed. Follow-up labs ordered.   Skin check normal.  Multiple scattered nevi present–advised to follow-up with dermatology for a screeni a follow-up at that time to discuss treatments if necessary. Consider a CT calcium scoring heart scan. Exercise for 15-20 minutes daily. Paresthesia of both feet     New onset burning, tingling bilateral feet.   She has been taking over-the-coun

## 2018-09-23 LAB — HPV I/H RISK 1 DNA SPEC QL NAA+PROBE: NEGATIVE

## 2018-11-27 ENCOUNTER — HOSPITAL ENCOUNTER (OUTPATIENT)
Dept: CT IMAGING | Age: 53
Discharge: HOME | End: 2018-11-27
Attending: INTERNAL MEDICINE

## 2018-11-27 DIAGNOSIS — Z13.9 ENCOUNTER FOR SCREENING: ICD-10-CM

## 2018-11-27 NOTE — PROGRESS NOTES
Pt seen at [facility], [facility] for CTHS:  PRELIMINARY SCORE= 0.0    BP= 112/60    Cholestec labs as follows: Fasting  TC= 230  HDL= 69  LDL= 149  TG= 60  GLUCOSE= 79    All results and risk factors discussed with patient; all questions and concerns addressed.

## 2018-12-17 ENCOUNTER — OFFICE VISIT (OUTPATIENT)
Dept: OBGYN CLINIC | Facility: CLINIC | Age: 53
End: 2018-12-17
Payer: COMMERCIAL

## 2018-12-17 ENCOUNTER — APPOINTMENT (OUTPATIENT)
Dept: LAB | Facility: HOSPITAL | Age: 53
End: 2018-12-17
Attending: OBSTETRICS & GYNECOLOGY
Payer: COMMERCIAL

## 2018-12-17 VITALS
WEIGHT: 132 LBS | SYSTOLIC BLOOD PRESSURE: 106 MMHG | DIASTOLIC BLOOD PRESSURE: 71 MMHG | BODY MASS INDEX: 21 KG/M2 | HEART RATE: 72 BPM

## 2018-12-17 DIAGNOSIS — N36.8 PAIN IN URETHRAL MEATUS: Primary | ICD-10-CM

## 2018-12-17 DIAGNOSIS — N36.8 PAIN IN URETHRAL MEATUS: ICD-10-CM

## 2018-12-17 PROCEDURE — 87086 URINE CULTURE/COLONY COUNT: CPT

## 2018-12-17 PROCEDURE — 81001 URINALYSIS AUTO W/SCOPE: CPT

## 2018-12-17 PROCEDURE — 99213 OFFICE O/P EST LOW 20 MIN: CPT | Performed by: OBSTETRICS & GYNECOLOGY

## 2018-12-17 NOTE — PROGRESS NOTES
Sara Leyva    1965       Patient presents with:  Gyn Problem: BURNING  pt thinks she has had recurrent UTI's for the past year.   She has a burning sensation around her urethra and then she drinks cranberry juice and most of the time it goes and situation.  Appropriate mood and affect    Pelvic Exam:  External Genitalia: normal appearance, hair distribution, and no lesions  Urethral Meatus:  normal in size, location, without lesions and prolapse  Bladder:  No fullness, masses or tenderness  Vag

## 2019-03-23 ENCOUNTER — HOSPITAL ENCOUNTER (OUTPATIENT)
Age: 54
Discharge: HOME | End: 2019-03-23
Attending: EMERGENCY MEDICINE
Payer: COMMERCIAL

## 2019-03-23 VITALS
DIASTOLIC BLOOD PRESSURE: 82 MMHG | HEART RATE: 75 BPM | RESPIRATION RATE: 16 BRPM | SYSTOLIC BLOOD PRESSURE: 116 MMHG | WEIGHT: 130 LBS | TEMPERATURE: 98 F | OXYGEN SATURATION: 100 % | HEIGHT: 67 IN | BODY MASS INDEX: 20.4 KG/M2

## 2019-03-23 DIAGNOSIS — R30.0 DYSURIA: Primary | ICD-10-CM

## 2019-03-23 LAB
B-HCG UR QL: NEGATIVE
CLARITY UR: CLEAR
COLOR UR: YELLOW
GLUCOSE UR STRIP-MCNC: NEGATIVE MG/DL
HGB UR QL STRIP: NEGATIVE
NITRITE UR QL STRIP: NEGATIVE
PH UR STRIP: 5 [PH]
PROT UR STRIP-MCNC: 30 MG/DL
SP GR UR STRIP: >=1.03
UROBILINOGEN UR STRIP-ACNC: <2 MG/DL

## 2019-03-23 PROCEDURE — 87086 URINE CULTURE/COLONY COUNT: CPT | Performed by: EMERGENCY MEDICINE

## 2019-03-23 PROCEDURE — 81025 URINE PREGNANCY TEST: CPT

## 2019-03-23 PROCEDURE — 99214 OFFICE O/P EST MOD 30 MIN: CPT

## 2019-03-23 PROCEDURE — 81003 URINALYSIS AUTO W/O SCOPE: CPT

## 2019-03-23 RX ORDER — THIAMINE MONONITRATE (VIT B1) 100 MG
100 TABLET ORAL DAILY
COMMUNITY
End: 2020-11-11

## 2019-03-23 RX ORDER — PHENAZOPYRIDINE HYDROCHLORIDE 200 MG/1
200 TABLET, FILM COATED ORAL 3 TIMES DAILY
Qty: 6 TABLET | Refills: 0 | Status: SHIPPED | OUTPATIENT
Start: 2019-03-23 | End: 2019-03-25

## 2019-03-23 RX ORDER — CEPHALEXIN 500 MG/1
500 CAPSULE ORAL 2 TIMES DAILY
Qty: 14 CAPSULE | Refills: 0 | Status: SHIPPED | OUTPATIENT
Start: 2019-03-23 | End: 2019-03-30

## 2019-03-23 NOTE — ED PROVIDER NOTES
Patient Seen in: [address]    History   Patient presents with:  Urinary Symptoms (urologic)    Stated Complaint: uti    HPI    This patient states for the past 2 days she has noted frequency and urgency of urination.   I 75   Temp 97.9 °F (36.6 °C) (Oral)   Resp 16   Ht 170.2 cm (5' 7\")   Wt 59 kg   LMP 11/25/2014 (Exact Date)   SpO2 100%   BMI 20.36 kg/m²         Physical Exam    The patient is awake and alert  Eyes pupils are equal  ENT there are moist mucous membranes

## 2019-03-23 NOTE — ED INITIAL ASSESSMENT (HPI)
Patient states having UTI symptoms since Thursday, increased pressure with urination. Patient denies fever, denies back pain, denies seeing blood in urine. Patient states feeling some urinary incontinence.

## 2019-04-09 NOTE — TELEPHONE ENCOUNTER
Review pended refill request as it does not fall under a protocol. Last Rx: 09/24/18  LOV: 09/21/18.  (this rx is not on the current medlist).

## 2019-04-10 RX ORDER — ERGOCALCIFEROL 1.25 MG/1
CAPSULE ORAL
Qty: 13 CAPSULE | Refills: 1 | Status: SHIPPED | OUTPATIENT
Start: 2019-04-10 | End: 2020-05-08

## 2019-04-15 ENCOUNTER — OFFICE VISIT (OUTPATIENT)
Dept: INTERNAL MEDICINE CLINIC | Facility: CLINIC | Age: 54
End: 2019-04-15
Payer: COMMERCIAL

## 2019-04-15 ENCOUNTER — TELEPHONE (OUTPATIENT)
Dept: OPTOMETRY | Facility: CLINIC | Age: 54
End: 2019-04-15

## 2019-04-15 VITALS
HEART RATE: 71 BPM | BODY MASS INDEX: 20.95 KG/M2 | DIASTOLIC BLOOD PRESSURE: 71 MMHG | TEMPERATURE: 98 F | HEIGHT: 67 IN | SYSTOLIC BLOOD PRESSURE: 103 MMHG | WEIGHT: 133.5 LBS

## 2019-04-15 DIAGNOSIS — R05.9 COUGH: Primary | ICD-10-CM

## 2019-04-15 PROCEDURE — 99212 OFFICE O/P EST SF 10 MIN: CPT | Performed by: INTERNAL MEDICINE

## 2019-04-15 PROCEDURE — 99213 OFFICE O/P EST LOW 20 MIN: CPT | Performed by: INTERNAL MEDICINE

## 2019-04-15 NOTE — PROGRESS NOTES
Netta Goldstein is a 49year old female. Patient presents with:  Cough      HPI:     HPI   Patient is here with complaints of cough that started 2 days ago. Last week she was having some chest congestion.   Denies any chest pain, shortness of breath or Negative for blurred vision, discharge and redness. Respiratory: Positive for cough. Negative for hemoptysis, sputum production, shortness of breath and wheezing. Cardiovascular: Negative for chest pain and leg swelling.    Gastrointestinal: Negative f over-the-counter antihistamine like Claritin or Allegra. Her lung exam was normal.  Patient to contact me if the symptoms fail to improve. Counseled on smoking cessation. The patient indicates understanding of these issues and agrees to the plan.

## 2019-04-17 ENCOUNTER — HOSPITAL ENCOUNTER (OUTPATIENT)
Dept: GENERAL RADIOLOGY | Facility: HOSPITAL | Age: 54
Discharge: HOME | End: 2019-04-17
Attending: INTERNAL MEDICINE
Payer: COMMERCIAL

## 2019-04-17 ENCOUNTER — TELEPHONE (OUTPATIENT)
Dept: OTHER | Age: 54
End: 2019-04-17

## 2019-04-17 DIAGNOSIS — R05.9 COUGH: ICD-10-CM

## 2019-04-17 DIAGNOSIS — R05.9 COUGH: Primary | ICD-10-CM

## 2019-04-17 PROCEDURE — 71046 X-RAY EXAM CHEST 2 VIEWS: CPT | Performed by: INTERNAL MEDICINE

## 2019-04-17 NOTE — TELEPHONE ENCOUNTER
Advised patient of Dr Faizan Mitchell  note. Patient verbalized understanding and will go today and had no further questions.

## 2019-04-17 NOTE — TELEPHONE ENCOUNTER
pt stated that she was in to see you on Monday and you advised her to wait on getting a x ray done. Pt stated that her cough is still there about the same but her chest feels a little more tighter then at the Ov.  She will like for you to order her

## 2019-04-25 ENCOUNTER — NURSE TRIAGE (OUTPATIENT)
Dept: OTHER | Age: 54
End: 2019-04-25

## 2019-04-25 NOTE — TELEPHONE ENCOUNTER
Action Requested: Summary for Provider     []  Critical Lab, Recommendations Needed  [] Need Additional Advice  []   FYI    []   Need Orders  [] Need Medications Sent to Pharmacy  []  Other     SUMMARY: appt made Friday with Momo Bella

## 2019-06-18 ENCOUNTER — OFFICE VISIT (OUTPATIENT)
Dept: INTERNAL MEDICINE CLINIC | Facility: CLINIC | Age: 54
End: 2019-06-18
Payer: COMMERCIAL

## 2019-06-18 VITALS
SYSTOLIC BLOOD PRESSURE: 100 MMHG | HEART RATE: 80 BPM | HEIGHT: 67 IN | BODY MASS INDEX: 20.69 KG/M2 | WEIGHT: 131.81 LBS | DIASTOLIC BLOOD PRESSURE: 62 MMHG | TEMPERATURE: 98 F

## 2019-06-18 DIAGNOSIS — L30.9 ECZEMA OF SCALP: ICD-10-CM

## 2019-06-18 DIAGNOSIS — R59.9 REACTIVE LYMPHADENOPATHY: Primary | ICD-10-CM

## 2019-06-18 PROCEDURE — 99212 OFFICE O/P EST SF 10 MIN: CPT | Performed by: INTERNAL MEDICINE

## 2019-06-18 PROCEDURE — 99213 OFFICE O/P EST LOW 20 MIN: CPT | Performed by: INTERNAL MEDICINE

## 2019-06-18 NOTE — PROGRESS NOTES
Fabi Little is a 49year old female. Patient presents with:  Lump      HPI:     HPI  Patient is here with a complaints of lump on the head. Reports that she noticed it 2 days ago.   Feels like an asymmetric area on the left side of her frontal scalp Negative. Neurological: Negative.           EXAM:   /62 (BP Location: Left arm, Patient Position: Sitting, Cuff Size: adult)   Pulse 80   Temp 97.8 °F (36.6 °C) (Oral)   Ht 5' 7\" (1.702 m)   Wt 131 lb 12.8 oz (59.8 kg)   LMP 11/25/2014 (Exact Date in 2 weeks if the symptoms fail to improve. The patient indicates understanding of these issues and agrees to the plan.                 Teresa Queen MD  6/18/2019  12:17 PM

## 2019-06-20 ENCOUNTER — TELEPHONE (OUTPATIENT)
Dept: INTERNAL MEDICINE CLINIC | Facility: CLINIC | Age: 54
End: 2019-06-20

## 2019-06-20 RX ORDER — CEPHALEXIN 500 MG/1
500 CAPSULE ORAL 3 TIMES DAILY
Qty: 21 CAPSULE | Refills: 0 | Status: SHIPPED | OUTPATIENT
Start: 2019-06-20 | End: 2019-06-24

## 2019-06-20 NOTE — TELEPHONE ENCOUNTER
Action Requested: Summary for Provider     []  Critical Lab, Recommendations Needed  [x] Need Additional Advice  [x]   FYI    []   Need Orders  [x] Need Medications Sent to Pharmacy  []  Other     SUMMARY: Patient called saw CHEIKH on 6/18/19 for  lump in

## 2019-06-20 NOTE — TELEPHONE ENCOUNTER
Antibiotics-Keflex sent to the pharmacy.   Please inform the patient to follow-up if the symptoms fail to improve in 3 to 5 days

## 2019-06-21 ENCOUNTER — TELEPHONE (OUTPATIENT)
Dept: INTERNAL MEDICINE CLINIC | Facility: CLINIC | Age: 54
End: 2019-06-21

## 2019-06-21 NOTE — TELEPHONE ENCOUNTER
JULIA. Can close the encounter  I talked to the patient has mild left eyelid sensitivity. She didnot have skin changes during OV, was given keflex as she called back with swelling of preauricular lymphnode.  Mild pain and swelling per patient in the left temp

## 2019-06-21 NOTE — TELEPHONE ENCOUNTER
Patient seen in [address] [place] [address] Rd 6/18/19 for lump to head and lymph node swelling. Is currently on antibiotic and using ibuprofen as needed.  Following up with new symptoms, reports the left eye to have some mild itching and feels aching with blinking, also with puffines

## 2019-06-22 ENCOUNTER — APPOINTMENT (OUTPATIENT)
Dept: CT IMAGING | Facility: HOSPITAL | Age: 54
End: 2019-06-22
Attending: EMERGENCY MEDICINE
Payer: COMMERCIAL

## 2019-06-22 ENCOUNTER — HOSPITAL ENCOUNTER (OUTPATIENT)
Age: 54
Discharge: ANOTHER HOSPITAL | End: 2019-06-22
Attending: EMERGENCY MEDICINE
Payer: COMMERCIAL

## 2019-06-22 ENCOUNTER — HOSPITAL ENCOUNTER (EMERGENCY)
Facility: HOSPITAL | Age: 54
Discharge: HOME | End: 2019-06-22
Attending: EMERGENCY MEDICINE
Payer: COMMERCIAL

## 2019-06-22 VITALS
TEMPERATURE: 99 F | OXYGEN SATURATION: 100 % | HEART RATE: 92 BPM | WEIGHT: 130 LBS | BODY MASS INDEX: 20.4 KG/M2 | DIASTOLIC BLOOD PRESSURE: 83 MMHG | HEIGHT: 67 IN | SYSTOLIC BLOOD PRESSURE: 117 MMHG | RESPIRATION RATE: 18 BRPM

## 2019-06-22 VITALS
OXYGEN SATURATION: 98 % | HEART RATE: 89 BPM | TEMPERATURE: 98 F | DIASTOLIC BLOOD PRESSURE: 70 MMHG | RESPIRATION RATE: 16 BRPM | HEIGHT: 67 IN | WEIGHT: 131 LBS | SYSTOLIC BLOOD PRESSURE: 110 MMHG | BODY MASS INDEX: 20.56 KG/M2

## 2019-06-22 DIAGNOSIS — R20.2 PARESTHESIAS: Primary | ICD-10-CM

## 2019-06-22 DIAGNOSIS — L03.211 FACIAL CELLULITIS: Primary | ICD-10-CM

## 2019-06-22 PROCEDURE — 96374 THER/PROPH/DIAG INJ IV PUSH: CPT

## 2019-06-22 PROCEDURE — 99212 OFFICE O/P EST SF 10 MIN: CPT

## 2019-06-22 PROCEDURE — 85025 COMPLETE CBC W/AUTO DIFF WBC: CPT | Performed by: EMERGENCY MEDICINE

## 2019-06-22 PROCEDURE — 99213 OFFICE O/P EST LOW 20 MIN: CPT

## 2019-06-22 PROCEDURE — 99284 EMERGENCY DEPT VISIT MOD MDM: CPT

## 2019-06-22 PROCEDURE — 85652 RBC SED RATE AUTOMATED: CPT | Performed by: EMERGENCY MEDICINE

## 2019-06-22 PROCEDURE — 86140 C-REACTIVE PROTEIN: CPT | Performed by: EMERGENCY MEDICINE

## 2019-06-22 PROCEDURE — 80048 BASIC METABOLIC PNL TOTAL CA: CPT | Performed by: EMERGENCY MEDICINE

## 2019-06-22 PROCEDURE — 70487 CT MAXILLOFACIAL W/DYE: CPT | Performed by: EMERGENCY MEDICINE

## 2019-06-22 RX ORDER — KETOROLAC TROMETHAMINE 30 MG/ML
30 INJECTION, SOLUTION INTRAMUSCULAR; INTRAVENOUS ONCE
Status: COMPLETED | OUTPATIENT
Start: 2019-06-22 | End: 2019-06-22

## 2019-06-22 RX ORDER — SULFAMETHOXAZOLE AND TRIMETHOPRIM 800; 160 MG/1; MG/1
1 TABLET ORAL 2 TIMES DAILY
Qty: 14 TABLET | Refills: 0 | Status: SHIPPED | OUTPATIENT
Start: 2019-06-22 | End: 2019-06-29

## 2019-06-22 NOTE — ED PROVIDER NOTES
Patient Seen in: [facility]    History   Patient presents with:  Pain (neurologic)    Stated Complaint: head and face pain    HPI    Patient is here with onset of some symptoms that developed 4 days ago.   She is having a sensa by mouth. Vitamin B-1 100 MG Oral Tab,  Take 100 mg by mouth daily. Clindamycin Phosphate 2 % Vaginal Cream,  Place 1 applicator vaginally. Estradiol 0.1 MG/GM Vaginal Cream,  Place 0.5 g vaginally nightly.  Use nightly x  2 weeks then every Monday / no tenderness to the teeth no malocclusion no trismus tolerating secretions. No crepitus is noted. There is no involvement of the orbits that are visualized. Eye:  No scleral icterus. Eyelids appear normal, no lesions.   Cardiovascular:  Normal S1 and S MDM     100% Normal  Pulse oximetry         Disposition and Plan     We recommend that you schedule follow up care with a primary care provider within the next three months to obtain basic health screening including reassessment of your blood pressu

## 2019-06-22 NOTE — ED NOTES
Pt c/o pain and swelling to left side of face/eyelid. C/o a headache since yesterday. Denies vision changes. Reports light sensitivity.

## 2019-06-22 NOTE — ED NOTES
Patient discharged to [place] for further evaluation of her head and face pain. Patient went by private vehicle.

## 2019-06-22 NOTE — ED INITIAL ASSESSMENT (HPI)
Patient is here with pain to her forehead, swelling to her eye and pain on the left side of her face.

## 2019-06-22 NOTE — ED PROVIDER NOTES
Patient Seen in: [address]    History   Patient presents with:  Pain (neurologic)    Stated Complaint: head pain, eye problem    HPI    56-year-old female presents for evaluation of headache.   Patient reports approximat Device None (Room air)       Current:/83   Pulse 92   Temp 99 °F (37.2 °C) (Oral)   Resp 18   Ht 170.2 cm (5' 7\")   Wt 59 kg   LMP 11/25/2014 (Exact Date)   SpO2 100%   BMI 20.36 kg/m²         Physical Exam   Constitutional: She is oriented to [place]

## 2019-06-22 NOTE — ED INITIAL ASSESSMENT (HPI)
ptm c/o worsening pain to left side of upper forehead radiating down left side of face, pt c/o itching to left eye, HA, left ear pain, on cephalexin x 1 day

## 2019-06-24 ENCOUNTER — TELEPHONE (OUTPATIENT)
Dept: OPHTHALMOLOGY | Facility: CLINIC | Age: 54
End: 2019-06-24

## 2019-06-24 ENCOUNTER — OFFICE VISIT (OUTPATIENT)
Dept: OPHTHALMOLOGY | Facility: CLINIC | Age: 54
End: 2019-06-24
Payer: COMMERCIAL

## 2019-06-24 DIAGNOSIS — B02.39: Primary | ICD-10-CM

## 2019-06-24 PROBLEM — B02.30 HERPES ZOSTER OPHTHALMICUS, LEFT EYE: Status: ACTIVE | Noted: 2019-06-24

## 2019-06-24 PROCEDURE — 99214 OFFICE O/P EST MOD 30 MIN: CPT | Performed by: OPHTHALMOLOGY

## 2019-06-24 PROCEDURE — 99212 OFFICE O/P EST SF 10 MIN: CPT | Performed by: OPHTHALMOLOGY

## 2019-06-24 RX ORDER — VALACYCLOVIR HYDROCHLORIDE 1 G/1
1 TABLET, FILM COATED ORAL 3 TIMES DAILY
Qty: 21 TABLET | Refills: 0 | Status: SHIPPED | OUTPATIENT
Start: 2019-06-24 | End: 2019-07-01

## 2019-06-24 NOTE — TELEPHONE ENCOUNTER
Pt has facial cellulitis and states it has now spread into her left eye. Pt is having redness, swelling, and pain in left eye.

## 2019-06-24 NOTE — TELEPHONE ENCOUNTER
Appointment scheduled today with JOSÉ MIGUEL at 11:45. Swelling around left eye- sounds like possible shingles.

## 2019-06-24 NOTE — PROGRESS NOTES
Neeta Walls is a 49year old female. HPI:     HPI     New patient here for an evaluation of swelling OS and pain (2/10) when blinking since Thursday night on 6/20/19.  Pt states she started out with bumps and skin sensitivity on the left side of he smoker,reduced to 1/4 ppd x 10 yrs     Alcohol use: No      Alcohol/week: 0.0 oz    Drug use: No      Medications:    Current Outpatient Medications:  valACYclovir HCl 1 G Oral Tab Take 1 tablet (1,000 mg total) by mouth 3 (three) times daily for 7 days.  D dermatitis of eyelid- left  Discussed diagnosis and treatment in detail with patient. Start 1,000 mg of Valtrex 3 times a day for 7 days, then discontinue. Continue Sulfamethoxazole and finish as directed.     Told patient to watch for symptoms of pain,

## 2019-06-24 NOTE — PATIENT INSTRUCTIONS
Herpes zoster type dermatitis of eyelid- left  Discussed diagnosis and treatment in detail with patient. Start 1,000 mg of Valtrex 3 times a day for 7 days, then discontinue. Continue Sulfamethoxazole and finish as directed.     Told patient to watch fo ? In a few people, usually those with weakened immune systems, shingles appear on more than one part of the body at once. · After a few days, the blisters become dry and form a crust. The crust falls off in days to weeks.  The blisters generally do not moise · Increased drainage, fever, or rash after treatment, or severe pain that doesn’t go away   How can shingles be prevented? You can only get shingles if you have had chickenpox in the past. Those who have never had chickenpox can get the virus from you.  Al Exposure to shingles can't cause shingles. However, it can cause chicken pox in anyone who has not had chicken pox or has not been vaccinated. The contagious period ends when all blisters have crusted over, generally 1 to 2 weeks after the illness starts. · Blisters occurring on new areas of the body  · Pain, redness, or swelling of a joint  · Signs of skin infection: colored drainage from the sores, warmth, increasing redness, fever, or increasing pain  Date Last Reviewed: 4/1/2018  © 8220-6229 [employer]

## 2019-06-24 NOTE — ASSESSMENT & PLAN NOTE
Discussed diagnosis and treatment in detail with patient. Start 1,000 mg of Valtrex 3 times a day for 7 days, then discontinue. Continue Sulfamethoxazole and finish as directed.     Told patient to watch for symptoms of pain, redness, light sensitivity

## 2019-08-12 ENCOUNTER — NURSE TRIAGE (OUTPATIENT)
Dept: OTHER | Age: 54
End: 2019-08-12

## 2019-08-12 ENCOUNTER — LAB ENCOUNTER (OUTPATIENT)
Dept: LAB | Age: 54
End: 2019-08-12
Attending: PHYSICIAN ASSISTANT
Payer: COMMERCIAL

## 2019-08-12 ENCOUNTER — OFFICE VISIT (OUTPATIENT)
Dept: INTERNAL MEDICINE CLINIC | Facility: CLINIC | Age: 54
End: 2019-08-12
Payer: COMMERCIAL

## 2019-08-12 VITALS
WEIGHT: 128.38 LBS | HEIGHT: 67 IN | DIASTOLIC BLOOD PRESSURE: 73 MMHG | HEART RATE: 90 BPM | BODY MASS INDEX: 20.15 KG/M2 | SYSTOLIC BLOOD PRESSURE: 105 MMHG | RESPIRATION RATE: 17 BRPM

## 2019-08-12 DIAGNOSIS — R20.2 PARESTHESIA OF BOTH FEET: ICD-10-CM

## 2019-08-12 DIAGNOSIS — R07.89 OTHER CHEST PAIN: Primary | ICD-10-CM

## 2019-08-12 DIAGNOSIS — Z00.00 ROUTINE PHYSICAL EXAMINATION: ICD-10-CM

## 2019-08-12 DIAGNOSIS — R07.89 CHEST PRESSURE: Primary | ICD-10-CM

## 2019-08-12 PROCEDURE — 93000 ELECTROCARDIOGRAM COMPLETE: CPT | Performed by: PHYSICIAN ASSISTANT

## 2019-08-12 PROCEDURE — 93010 ELECTROCARDIOGRAM REPORT: CPT | Performed by: PHYSICIAN ASSISTANT

## 2019-08-12 PROCEDURE — 93005 ELECTROCARDIOGRAM TRACING: CPT

## 2019-08-12 PROCEDURE — 99213 OFFICE O/P EST LOW 20 MIN: CPT | Performed by: PHYSICIAN ASSISTANT

## 2019-08-12 NOTE — PROGRESS NOTES
HPI:    Patient ID: Mandy Duque is a 49year old female. HPI  Complains of chest pain and pressure for a month with some difficulty breathing.  States it is intermittent, and sometimes notices it if she physically exerts herself, or while taking th Tobacco comment: was a 1ppd smoker,reduced to 1/4 ppd x 10 yrs     Alcohol use: No      Alcohol/week: 0.0 standard drinks    Past Surgical History:   Procedure Laterality Date   • BREAST BIOPSY NEEDLE LOCALIZATION Left 8/24/2017    Performed by Missy Ramirez, content normal.              ASSESSMENT/PLAN:   1.  Chest pressure  -will obtain testing and call patient with results  -strongly encouraged smoking cessation   -advised to decrease caffeine and stress level.  -monitor sx and follow up if persist or worsen

## 2019-08-12 NOTE — TELEPHONE ENCOUNTER
Action Requested: Summary for Provider     []  Critical Lab, Recommendations Needed  [] Need Additional Advice  []   FYI    []   Need Orders  [] Need Medications Sent to Pharmacy  []  Other     SUMMARY: Pt stated that for the past month she has been feelin

## 2019-08-14 ENCOUNTER — PATIENT MESSAGE (OUTPATIENT)
Dept: INTERNAL MEDICINE CLINIC | Facility: CLINIC | Age: 54
End: 2019-08-14

## 2019-08-15 NOTE — TELEPHONE ENCOUNTER
[address] [facility] please see pt mychart message below and advise if you will like for pt to make a OV.  Thanks

## 2019-08-16 NOTE — TELEPHONE ENCOUNTER
Please set up an appointment for this patient– okay to add as a double end of the day any day next week.

## 2019-08-20 ENCOUNTER — OFFICE VISIT (OUTPATIENT)
Dept: INTERNAL MEDICINE CLINIC | Facility: CLINIC | Age: 54
End: 2019-08-20
Payer: COMMERCIAL

## 2019-08-20 VITALS
HEIGHT: 67 IN | RESPIRATION RATE: 16 BRPM | WEIGHT: 128 LBS | BODY MASS INDEX: 20.09 KG/M2 | HEART RATE: 76 BPM | DIASTOLIC BLOOD PRESSURE: 67 MMHG | SYSTOLIC BLOOD PRESSURE: 96 MMHG

## 2019-08-20 DIAGNOSIS — F17.200 TOBACCO USE DISORDER: ICD-10-CM

## 2019-08-20 DIAGNOSIS — R07.89 CHEST PRESSURE: Primary | ICD-10-CM

## 2019-08-20 DIAGNOSIS — R06.00 DOE (DYSPNEA ON EXERTION): ICD-10-CM

## 2019-08-20 PROBLEM — R06.09 DOE (DYSPNEA ON EXERTION): Status: ACTIVE | Noted: 2019-08-20

## 2019-08-20 PROCEDURE — 99214 OFFICE O/P EST MOD 30 MIN: CPT | Performed by: INTERNAL MEDICINE

## 2019-08-21 NOTE — PATIENT INSTRUCTIONS
Problem List Items Addressed This Visit        Unprioritized    Chest pressure - Primary     Chest pressure as well as dyspnea on exertion that has been gradually progressive over the past month.   She has had significant amount of stressors and has been sm

## 2019-08-21 NOTE — ASSESSMENT & PLAN NOTE
Chest pressure with dyspnea on exertion. Patient with multiple risk factors–family history of CAD, smoking history. Will follow-up after tests are completed. Echocardiogram, treadmill stress test and pulmonary functions were ordered.

## 2019-08-21 NOTE — PROGRESS NOTES
HPI:    Patient ID: Viki Suh is a 49year old female.     Recurrent episodes of chest tightness acros the chest.  Has been under stress and has been smoking a lot,concerned about risks for cad as has a family hx of cad    Smoking hx    1/4 ppd gia normal.   Mouth/Throat: Oropharynx is clear and moist. No oropharyngeal exudate. Eyes: Pupils are equal, round, and reactive to light. Conjunctivae and EOM are normal. Right eye exhibits no discharge. Left eye exhibits no discharge.    Neck: Normal range TREADMILL STRESS, ADULT (CPT=93017)    COMPLETE PFT          Return in about 6 weeks (around 10/1/2019), or if symptoms worsen or fail to improve.     PT UNDERSTANDS AND AGREES TO FOLLOW DIRECTIONS AND ADVICE    No orders of the defined types were placed in

## 2019-08-21 NOTE — ASSESSMENT & PLAN NOTE
Chest pressure as well as dyspnea on exertion that has been gradually progressive over the past month. She has had significant amount of stressors and has been smoking more than usual.  Smoking history of about 15 pack years.   EKG without any significant

## 2019-08-22 ENCOUNTER — NURSE ONLY (OUTPATIENT)
Dept: GASTROENTEROLOGY | Facility: CLINIC | Age: 54
End: 2019-08-22

## 2019-08-22 DIAGNOSIS — Z12.11 COLON CANCER SCREENING: Primary | ICD-10-CM

## 2019-09-16 NOTE — PROGRESS NOTES
The patient's chart has been reviewed. Okay to schedule pt for CLN r/t screening for colon cancer with Dr. Micheal Carbone, Dr. Alicia Bender, or Dr. Jarad Mccurdy.      Advise IV Twilight or MAC sedation with split dose Colyte or equivalent (eRx) preparation.   -Eligible for

## 2019-09-16 NOTE — PROGRESS NOTES
CBLM to schedule procedure. Please transfer to Adamaris Blum at ext [phone] or [phone] for scheduling.

## 2019-10-28 NOTE — PROGRESS NOTES
Scheduled for:  Colonoscopy 60181  Provider Name: Dr. Abel Melendez  Date:  11/1/19  Location:  [facility]  Sedation:  MAC  Time:   8316 (pt is aware to arrive at [facility] )   Prep:  Colyte, sent via [employer]/Allergies Reconciled?:  Physician reviewed   Diagnosis with co

## 2019-10-30 ENCOUNTER — TELEPHONE (OUTPATIENT)
Dept: GASTROENTEROLOGY | Facility: CLINIC | Age: 54
End: 2019-10-30

## 2019-10-30 NOTE — TELEPHONE ENCOUNTER
Patient has questions regarding Preps for 11/1/2019 CLN. Please call - unable to reach Nurse. Thank you.

## 2019-10-30 NOTE — TELEPHONE ENCOUNTER
Pt contacted and reviewed instructions with her. Pt was also informed that prep instructions were sent to [facility] yesterday; appears that she has not read it yet.     Went over arrival time, place, diet, where bowel prep was sent and when, advised to johan

## 2019-10-31 ENCOUNTER — TELEPHONE (OUTPATIENT)
Dept: GASTROENTEROLOGY | Facility: CLINIC | Age: 54
End: 2019-10-31

## 2019-10-31 NOTE — TELEPHONE ENCOUNTER
Patient requesting to speak with nurse regarding 1 jug for tomorrows prep, patient asking if she should have 2 jugs, start at 5pm, please call at:  Tried to transfer call at:[phone],thanks.

## 2019-10-31 NOTE — TELEPHONE ENCOUNTER
Pt contacted and stated she called pharmacy and was also given instruction to take half of the jug today and half of the jug after.     I did go over it to her again that first half would be started now and finish by 8pm as we discussed yesterday and the se

## 2019-11-01 ENCOUNTER — ANESTHESIA EVENT (OUTPATIENT)
Dept: ENDOSCOPY | Facility: HOSPITAL | Age: 54
End: 2019-11-01
Payer: COMMERCIAL

## 2019-11-01 ENCOUNTER — ANESTHESIA (OUTPATIENT)
Dept: ENDOSCOPY | Facility: HOSPITAL | Age: 54
End: 2019-11-01
Payer: COMMERCIAL

## 2019-11-01 ENCOUNTER — HOSPITAL ENCOUNTER (OUTPATIENT)
Facility: HOSPITAL | Age: 54
Setting detail: HOSPITAL OUTPATIENT SURGERY
Discharge: HOME | End: 2019-11-01
Attending: INTERNAL MEDICINE | Admitting: INTERNAL MEDICINE
Payer: COMMERCIAL

## 2019-11-01 ENCOUNTER — TELEPHONE (OUTPATIENT)
Dept: GASTROENTEROLOGY | Facility: CLINIC | Age: 54
End: 2019-11-01

## 2019-11-01 DIAGNOSIS — K63.5 COLON POLYPS: Primary | ICD-10-CM

## 2019-11-01 DIAGNOSIS — Z12.11 COLON CANCER SCREENING: ICD-10-CM

## 2019-11-01 DIAGNOSIS — K64.9 HEMORRHOIDS, UNSPECIFIED HEMORRHOID TYPE: ICD-10-CM

## 2019-11-01 PROCEDURE — 0DBN8ZX EXCISION OF SIGMOID COLON, VIA NATURAL OR ARTIFICIAL OPENING ENDOSCOPIC, DIAGNOSTIC: ICD-10-PCS | Performed by: INTERNAL MEDICINE

## 2019-11-01 PROCEDURE — 45385 COLONOSCOPY W/LESION REMOVAL: CPT | Performed by: INTERNAL MEDICINE

## 2019-11-01 RX ORDER — LIDOCAINE HYDROCHLORIDE 20 MG/ML
INJECTION, SOLUTION EPIDURAL; INFILTRATION; INTRACAUDAL; PERINEURAL AS NEEDED
Status: DISCONTINUED | OUTPATIENT
Start: 2019-11-01 | End: 2019-11-01 | Stop reason: SURG

## 2019-11-01 RX ORDER — SODIUM CHLORIDE, SODIUM LACTATE, POTASSIUM CHLORIDE, CALCIUM CHLORIDE 600; 310; 30; 20 MG/100ML; MG/100ML; MG/100ML; MG/100ML
INJECTION, SOLUTION INTRAVENOUS CONTINUOUS
Status: DISCONTINUED | OUTPATIENT
Start: 2019-11-01 | End: 2019-11-01

## 2019-11-01 RX ADMIN — SODIUM CHLORIDE, SODIUM LACTATE, POTASSIUM CHLORIDE, CALCIUM CHLORIDE: 600; 310; 30; 20 INJECTION, SOLUTION INTRAVENOUS at 10:50:00

## 2019-11-01 RX ADMIN — LIDOCAINE HYDROCHLORIDE 40 MG: 20 INJECTION, SOLUTION EPIDURAL; INFILTRATION; INTRACAUDAL; PERINEURAL at 10:16:00

## 2019-11-01 NOTE — ANESTHESIA POSTPROCEDURE EVALUATION
Patient: Mark Smith    Procedure Summary     Date:  11/01/19 Room / Location:  [address] [place] [address] ENDOSCOPY 01 / [address] [place] [address] ENDOSCOPY    Anesthesia Start:  9743 Anesthesia Stop:      Procedure:  COLONOSCOPY (N/A ) Diagnosis:       Colon cancer screening      (Polyps, hem

## 2019-11-01 NOTE — H&P
History & Physical Examination    Patient Name: Winston Cardenas  MRN: [id number]  CSN: [id number]  YOB: 1965    Diagnosis: colon screening      ERGOCALCIFEROL 49203 units Oral Cap, TAKE 1 CAPSULE BY MOUTH 1 TIME A WEEK, Disp: 13 capsule, Rfl drinks      SYSTEM Check if Review is Normal Check if Physical Exam is Normal If not normal, please explain:   HEENT [x ] [ x]    NECK & BACK [x ] [x ]    HEART [x ] [ x]    LUNGS [x ] [ x]    ABDOMEN [x ] [x ]    UROGENITAL [ ] [ ]    EXTREMITIES [x ] [x

## 2019-11-01 NOTE — TELEPHONE ENCOUNTER
Julia Ley contacted me this morning as the on-call physician. She has a screening colonoscopy scheduled this morning with Dr. Marjorie Rueda.   She was able to complete the first half of the TriLyte preparation, however, after 2 glasses early this morning she promptl

## 2019-11-01 NOTE — OPERATIVE REPORT
[facility] HOSP - [facility] Endoscopy Report      Preoperative Diagnosis:  - colon cancer screening      Postoperative Diagnosis:  - colon polyps x 5  - internal hemorrhoids      Procedure:    Colonoscopy        Surgeon:  Janette Ellis M.D.     Anesthesia:

## 2019-11-01 NOTE — ANESTHESIA PREPROCEDURE EVALUATION
Anesthesia PreOp Note    HPI:     Winston Cardenas is a 49year old female who presents for preoperative consultation requested by: Lauren Garner MD    Date of Surgery: 11/1/2019    Procedure(s):  COLONOSCOPY  Indication: Colon cancer screening    Rel directed per GI consult notes, Disp: 1 Bottle, Rfl: 0      lactated ringers infusion, , Intravenous, Continuous, Lacie De Los Santos MD, Last Rate: 20 mL/hr at 11/01/19 0907  lidocaine PF (XYLOCAINE) 2%injection, , , PRN, Claire Bautista CRNA, 40 mg at 11/ on file        Minutes per session: Not on file      Stress: Not on file    Relationships      Social connections:        Talks on phone: Not on file        Gets together: Not on file        Attends Hinduism service: Not on file        Active member of cl 1  Plan:   MAC  Informed Consent Plan and Risks Discussed With:  Patient  Discussed plan with:  Attending      I have informed Rue Gouge and/or legal guardian or family member of the nature of the anesthetic plan, benefits, risks including possible

## 2019-11-05 VITALS
DIASTOLIC BLOOD PRESSURE: 74 MMHG | WEIGHT: 130 LBS | SYSTOLIC BLOOD PRESSURE: 100 MMHG | OXYGEN SATURATION: 100 % | BODY MASS INDEX: 20.65 KG/M2 | HEIGHT: 66.5 IN | RESPIRATION RATE: 15 BRPM | HEART RATE: 70 BPM

## 2019-12-09 ENCOUNTER — HOSPITAL ENCOUNTER (OUTPATIENT)
Dept: RESPIRATORY THERAPY | Facility: HOSPITAL | Age: 54
Discharge: HOME | End: 2019-12-09
Attending: INTERNAL MEDICINE
Payer: COMMERCIAL

## 2019-12-09 ENCOUNTER — HOSPITAL ENCOUNTER (OUTPATIENT)
Dept: CV DIAGNOSTICS | Facility: HOSPITAL | Age: 54
Discharge: HOME | End: 2019-12-09
Attending: INTERNAL MEDICINE
Payer: COMMERCIAL

## 2019-12-09 DIAGNOSIS — R07.89 CHEST PRESSURE: ICD-10-CM

## 2019-12-09 DIAGNOSIS — R06.00 DOE (DYSPNEA ON EXERTION): ICD-10-CM

## 2019-12-09 DIAGNOSIS — F17.200 TOBACCO USE DISORDER: ICD-10-CM

## 2019-12-09 PROCEDURE — 94726 PLETHYSMOGRAPHY LUNG VOLUMES: CPT | Performed by: INTERNAL MEDICINE

## 2019-12-09 PROCEDURE — 93306 TTE W/DOPPLER COMPLETE: CPT | Performed by: INTERNAL MEDICINE

## 2019-12-09 PROCEDURE — 93017 CV STRESS TEST TRACING ONLY: CPT | Performed by: INTERNAL MEDICINE

## 2019-12-09 PROCEDURE — 93018 CV STRESS TEST I&R ONLY: CPT | Performed by: INTERNAL MEDICINE

## 2019-12-09 PROCEDURE — 94729 DIFFUSING CAPACITY: CPT | Performed by: INTERNAL MEDICINE

## 2019-12-09 PROCEDURE — 94060 EVALUATION OF WHEEZING: CPT | Performed by: INTERNAL MEDICINE

## 2019-12-09 PROCEDURE — 93016 CV STRESS TEST SUPVJ ONLY: CPT | Performed by: INTERNAL MEDICINE

## 2019-12-10 ENCOUNTER — TELEPHONE (OUTPATIENT)
Dept: OPTOMETRY | Facility: CLINIC | Age: 54
End: 2019-12-10

## 2019-12-13 NOTE — ADDENDUM NOTE
Encounter addended by: Odilon Walsh MD on: 12/13/2019 3:54 PM   Actions taken: Clinical Note Signed, Charge Capture section accepted monthly or less

## 2019-12-13 NOTE — PROCEDURES
[facility]    Patient's Name Victor Manuel Zuniga MRN [id number]    1965 Pulmonologist Abner Khan MD   Location [facility] PCP Viktoria Sanchez MD     IMPRESSION:    The PFTs are Normal.    The DLCO is low no

## 2019-12-16 ENCOUNTER — TELEPHONE (OUTPATIENT)
Dept: OPHTHALMOLOGY | Facility: CLINIC | Age: 54
End: 2019-12-16

## 2019-12-16 NOTE — TELEPHONE ENCOUNTER
Per pt she believes she has an eye infection, states her eyes are weepy, and are blood shot red/yellow, asking for appointment soon. Please advise thank you.

## 2019-12-16 NOTE — TELEPHONE ENCOUNTER
Start of symptoms: 2 weeks thinks she has an infection  Which eye? both  Redness- yes  Discharge- yes- white  Pain- no   Photophobia- no  Blurred vision? No   Contacts? Yes- but have not been wearing them much  Recent cold?  No   Recent contact with conjunc

## 2019-12-17 ENCOUNTER — OFFICE VISIT (OUTPATIENT)
Dept: OPHTHALMOLOGY | Facility: CLINIC | Age: 54
End: 2019-12-17
Payer: COMMERCIAL

## 2019-12-17 DIAGNOSIS — H02.883 MEIBOMIAN GLAND DYSFUNCTION (MGD) OF BOTH EYES: Primary | ICD-10-CM

## 2019-12-17 DIAGNOSIS — H02.886 MEIBOMIAN GLAND DYSFUNCTION (MGD) OF BOTH EYES: Primary | ICD-10-CM

## 2019-12-17 PROBLEM — H10.9 CONJUNCTIVITIS OF BOTH EYES: Status: ACTIVE | Noted: 2019-12-17

## 2019-12-17 PROCEDURE — 99213 OFFICE O/P EST LOW 20 MIN: CPT | Performed by: OPHTHALMOLOGY

## 2019-12-17 RX ORDER — PREDNISOLONE ACETATE 10 MG/ML
SUSPENSION/ DROPS OPHTHALMIC
Qty: 1 BOTTLE | Refills: 0 | Status: SHIPPED | OUTPATIENT
Start: 2019-12-17 | End: 2019-12-24

## 2019-12-17 NOTE — PROGRESS NOTES
Netta Goldstein is a 49year old female. HPI:     HPI     Pt called yesterday stating that both eyes being red, tearing with white discharge x 2 weeks. Pt states that she wears contacts, but never sleeps in them.  Pt is not using any eye drops over the Cap TAKE 1 CAPSULE BY MOUTH 1 TIME A WEEK 13 capsule 1   • Magnesium Hydroxide (MAGNESIA OR) Take by mouth. • Vitamin B-1 100 MG Oral Tab Take 100 mg by mouth daily.          Allergies:  No Known Allergies    ROS:       PHYSICAL EXAM:     Base Eye Exam the counter brand is okay) up to 2-4   times per day as needed for irritation. No orders of the defined types were placed in this encounter.       Meds This Visit:  Requested Prescriptions     Signed Prescriptions Disp Refills   • prednisoLONE ac

## 2019-12-17 NOTE — ASSESSMENT & PLAN NOTE
Start Pred forte in both eyes;   Use 1 drop 2 times a day in both eyes for 1 week, then discontinue. Advised patient to see Dr. Dorita Dotson for a contact lens evaluation. Stressed importance of changing contacts on monthly basis.      Patient was instructed

## 2019-12-17 NOTE — PATIENT INSTRUCTIONS
Meibomian gland dysfunction (MGD) of both eyes  Start Pred forte in both eyes;   Use 1 drop 2 times a day in both eyes for 1 week, then discontinue. Advised patient to see Dr. Renetta Maravilla for a contact lens evaluation.      Stressed importance of changing conta

## 2019-12-31 ENCOUNTER — TELEPHONE (OUTPATIENT)
Dept: OPTOMETRY | Facility: CLINIC | Age: 54
End: 2019-12-31

## 2020-01-10 ENCOUNTER — TELEPHONE (OUTPATIENT)
Dept: OPTOMETRY | Facility: CLINIC | Age: 55
End: 2020-01-10

## 2020-01-16 ENCOUNTER — TELEPHONE (OUTPATIENT)
Dept: OPTOMETRY | Facility: CLINIC | Age: 55
End: 2020-01-16

## 2020-01-16 NOTE — TELEPHONE ENCOUNTER
I advised patient that still on ANASTACIA from [employer] so I called [facility] direct and they have it and will Fed Ex to me.

## 2020-01-21 ENCOUNTER — OFFICE VISIT (OUTPATIENT)
Dept: OBGYN CLINIC | Facility: CLINIC | Age: 55
End: 2020-01-21
Payer: COMMERCIAL

## 2020-01-21 VITALS
HEART RATE: 79 BPM | WEIGHT: 132.38 LBS | SYSTOLIC BLOOD PRESSURE: 107 MMHG | BODY MASS INDEX: 21 KG/M2 | DIASTOLIC BLOOD PRESSURE: 74 MMHG

## 2020-01-21 DIAGNOSIS — N76.0 VAGINITIS AND VULVOVAGINITIS: Primary | ICD-10-CM

## 2020-01-21 PROBLEM — Z00.00 ROUTINE PHYSICAL EXAMINATION: Status: RESOLVED | Noted: 2018-09-21 | Resolved: 2020-01-21

## 2020-01-21 PROBLEM — Z01.419 PAP TEST, AS PART OF ROUTINE GYNECOLOGICAL EXAMINATION: Status: RESOLVED | Noted: 2018-09-21 | Resolved: 2020-01-21

## 2020-01-21 PROCEDURE — 99213 OFFICE O/P EST LOW 20 MIN: CPT | Performed by: OBSTETRICS & GYNECOLOGY

## 2020-01-21 NOTE — PROGRESS NOTES
Marques Ortega is a 48year old female [id number] Patient's last menstrual period was 11/25/2014 (exact date). Patient presents with:  Gyn Problem: vaginal discharge, irritation, some odor -- for few months.  Last time happened used clinda cream & helped cl Alcohol use: No        Alcohol/week: 0.0 standard drinks      Drug use: No      Sexual activity: Yes        Partners: Male    Lifestyle      Physical activity:        Days per week: Not on file        Minutes per session: Not on file      Stress: Not on f palpitations  Respiratory:    denies shortness of breath  Gastrointestinal:   denies heartburn, abdominal pain, diarrhea or constipation  Genitourinary:    denies dysuria, incontinence, abnormal vaginal discharge, vaginal itching  Musculoskeletal:   denies

## 2020-01-22 LAB — TRICHOMONAS SCREEN: NEGATIVE

## 2020-03-03 ENCOUNTER — TELEPHONE (OUTPATIENT)
Dept: INTERNAL MEDICINE CLINIC | Facility: CLINIC | Age: 55
End: 2020-03-03

## 2020-03-03 DIAGNOSIS — R92.30 DENSE BREAST TISSUE ON MAMMOGRAM: ICD-10-CM

## 2020-03-03 DIAGNOSIS — R92.1 BREAST CALCIFICATION SEEN ON MAMMOGRAM: Primary | ICD-10-CM

## 2020-03-03 NOTE — TELEPHONE ENCOUNTER
Patient is requesting an order for a mammogram prior to her physical appointment on 5/4/2020, please call when order has been submitted.

## 2020-03-04 NOTE — TELEPHONE ENCOUNTER
Dr. Jeremiah Polk, patient is requesting a mammogram order. Last mammogram was completed 05/19/2017. Please advise on order.      Please reply to pool: EM TRIAGE SUPPORT

## 2020-04-27 ENCOUNTER — OFFICE VISIT (OUTPATIENT)
Dept: INTERNAL MEDICINE CLINIC | Facility: CLINIC | Age: 55
End: 2020-04-27
Payer: COMMERCIAL

## 2020-04-27 ENCOUNTER — APPOINTMENT (OUTPATIENT)
Dept: LAB | Facility: HOSPITAL | Age: 55
End: 2020-04-27
Attending: INTERNAL MEDICINE
Payer: COMMERCIAL

## 2020-04-27 VITALS
BODY MASS INDEX: 20.96 KG/M2 | SYSTOLIC BLOOD PRESSURE: 93 MMHG | WEIGHT: 132 LBS | HEART RATE: 80 BPM | TEMPERATURE: 98 F | HEIGHT: 66.5 IN | DIASTOLIC BLOOD PRESSURE: 61 MMHG

## 2020-04-27 DIAGNOSIS — E78.5 HYPERLIPIDEMIA, UNSPECIFIED HYPERLIPIDEMIA TYPE: ICD-10-CM

## 2020-04-27 DIAGNOSIS — J43.8 OTHER EMPHYSEMA (HCC): ICD-10-CM

## 2020-04-27 DIAGNOSIS — R30.0 DYSURIA: ICD-10-CM

## 2020-04-27 DIAGNOSIS — E55.9 VITAMIN D DEFICIENCY: ICD-10-CM

## 2020-04-27 DIAGNOSIS — R06.00 DOE (DYSPNEA ON EXERTION): Primary | ICD-10-CM

## 2020-04-27 DIAGNOSIS — F17.200 TOBACCO USE DISORDER: ICD-10-CM

## 2020-04-27 PROCEDURE — 99214 OFFICE O/P EST MOD 30 MIN: CPT | Performed by: INTERNAL MEDICINE

## 2020-04-27 PROCEDURE — 87086 URINE CULTURE/COLONY COUNT: CPT

## 2020-04-27 PROCEDURE — 87147 CULTURE TYPE IMMUNOLOGIC: CPT

## 2020-04-27 PROCEDURE — 81001 URINALYSIS AUTO W/SCOPE: CPT

## 2020-04-27 RX ORDER — FLUCONAZOLE 150 MG/1
150 TABLET ORAL ONCE
Qty: 1 TABLET | Refills: 0 | Status: SHIPPED | OUTPATIENT
Start: 2020-04-27 | End: 2020-04-27

## 2020-04-27 RX ORDER — CIPROFLOXACIN 500 MG/1
TABLET, FILM COATED ORAL
Qty: 10 TABLET | Refills: 0 | Status: SHIPPED | OUTPATIENT
Start: 2020-04-27 | End: 2020-05-12 | Stop reason: ALTCHOICE

## 2020-04-27 RX ORDER — VARENICLINE TARTRATE 0.5 MG/1
0.5 TABLET, FILM COATED ORAL DAILY
Qty: 30 TABLET | Refills: 3 | Status: SHIPPED | OUTPATIENT
Start: 2020-04-27 | End: 2020-05-12

## 2020-04-27 NOTE — ASSESSMENT & PLAN NOTE
PFT completed in December discussed. Elevated TLC but slightly low DLCO. Patient has had some dyspnea on exertion but otherwise is asymptomatic. She continues to smoke and is committed to trying to quit. Will consider starting on Chantix at this time.

## 2020-04-27 NOTE — ASSESSMENT & PLAN NOTE
Elevated LDL cholesterol. Ongoing history of smoking. Family history of heart disease in her mother and her maternal grandfather. Continue strict diet control. CT calcium scoring heart scan did not show any calcified plaques.   Advised to exercise on a

## 2020-04-27 NOTE — PROGRESS NOTES
HPI:    Patient ID: Marques Ortega is a 48year old female.     2 d echo doppler heart    Notes recorded by Charles Alvarez MD on 12/16/2019 at 9:07 PM CST  Echocardiogram shows normal heart size and wall thickness.  Pumping capacity of the heart looks no for congestion. Eyes: Negative. Respiratory: Positive for shortness of breath. Negative for cough. Cardiovascular: Positive for chest pain and palpitations. CT calcium scoring study in 2018 showed no plaques.   CT calcium scoring over read a reactive to light. Conjunctivae and EOM are normal. Right eye exhibits no discharge. Left eye exhibits no discharge. Neck: Normal range of motion. Neck supple. No JVD present. No thyromegaly present.    Cardiovascular: Normal rate, regular rhythm, normal and consider medications to reduce risks for atherosclerosis–PAD, heart disease and carotid disease         Relevant Orders    CBC WITH DIFFERENTIAL WITH PLATELET    COMP METABOLIC PANEL (14)    LIPID PANEL    ASSAY, THYROID STIM HORMONE    FULLER (dyspnea on Relevant Orders    VITAMIN B12    VITAMIN D, 25-HYDROXY          Return in about 3 months (around 7/27/2020).     PT UNDERSTANDS AND AGREES TO FOLLOW DIRECTIONS AND ADVICE    Orders Placed This Encounter      CBC With Differential With Platelet      Com

## 2020-04-27 NOTE — ASSESSMENT & PLAN NOTE
Patient has not been able to cut back on smoking. Has been smoking a little more than usual due to the current pandemic and anxiety related. Consider starting on nicotine gum. Consider starting on Chantix to help reduce urges.   Patient is willing to try

## 2020-04-27 NOTE — PATIENT INSTRUCTIONS
Problem List Items Addressed This Visit        Unprioritized    FULLER (dyspnea on exertion) - Primary     Feeling of chest pressure with dyspnea on exertion. Much improved at this time without intervention.   Multiple family risk factors–history of CAD, pers trying to quit. Will consider starting on Chantix at this time. Gradually cutting back on the number of cigarettes smoked and using an alternative like nicotine gum is another way of trying to quit smoking. Patient will be reassessed in about 3 months.

## 2020-04-27 NOTE — ASSESSMENT & PLAN NOTE
Feeling of chest pressure with dyspnea on exertion. Much improved at this time without intervention. Multiple family risk factors–history of CAD, personal history of smoking. Encouraged conscious effort at trying to quit smoking–patient is agreeable.   Vidal Danielson

## 2020-04-27 NOTE — ASSESSMENT & PLAN NOTE
Recurrent episodes of suprapubic pressure, burning with urination, frequency. Symptoms on and off for the past 1 month but recent symptoms began about 2 days back. Has noticed some blood/brown discharge.   Patient is advised to start on Cipro 500 mg twice

## 2020-04-29 NOTE — TELEPHONE ENCOUNTER
LMTCB. 

## 2020-05-08 ENCOUNTER — HOSPITAL ENCOUNTER (OUTPATIENT)
Dept: MAMMOGRAPHY | Facility: HOSPITAL | Age: 55
Discharge: HOME | End: 2020-05-08
Attending: INTERNAL MEDICINE
Payer: COMMERCIAL

## 2020-05-08 ENCOUNTER — LAB ENCOUNTER (OUTPATIENT)
Dept: LAB | Facility: HOSPITAL | Age: 55
End: 2020-05-08
Attending: INTERNAL MEDICINE
Payer: COMMERCIAL

## 2020-05-08 DIAGNOSIS — E55.9 VITAMIN D DEFICIENCY: ICD-10-CM

## 2020-05-08 DIAGNOSIS — R92.2 DENSE BREAST TISSUE ON MAMMOGRAM: ICD-10-CM

## 2020-05-08 DIAGNOSIS — E78.5 HYPERLIPIDEMIA, UNSPECIFIED HYPERLIPIDEMIA TYPE: ICD-10-CM

## 2020-05-08 DIAGNOSIS — R92.1 BREAST CALCIFICATION SEEN ON MAMMOGRAM: ICD-10-CM

## 2020-05-08 PROCEDURE — 77066 DX MAMMO INCL CAD BI: CPT | Performed by: INTERNAL MEDICINE

## 2020-05-08 PROCEDURE — 82306 VITAMIN D 25 HYDROXY: CPT

## 2020-05-08 PROCEDURE — 77062 BREAST TOMOSYNTHESIS BI: CPT | Performed by: INTERNAL MEDICINE

## 2020-05-08 PROCEDURE — 84443 ASSAY THYROID STIM HORMONE: CPT

## 2020-05-08 PROCEDURE — 85025 COMPLETE CBC W/AUTO DIFF WBC: CPT

## 2020-05-08 PROCEDURE — 80061 LIPID PANEL: CPT

## 2020-05-08 PROCEDURE — 82607 VITAMIN B-12: CPT

## 2020-05-08 PROCEDURE — 36415 COLL VENOUS BLD VENIPUNCTURE: CPT

## 2020-05-08 PROCEDURE — 80053 COMPREHEN METABOLIC PANEL: CPT

## 2020-05-11 ENCOUNTER — PATIENT MESSAGE (OUTPATIENT)
Dept: INTERNAL MEDICINE CLINIC | Facility: CLINIC | Age: 55
End: 2020-05-11

## 2020-05-11 ENCOUNTER — TELEPHONE (OUTPATIENT)
Dept: INTERNAL MEDICINE CLINIC | Facility: CLINIC | Age: 55
End: 2020-05-11

## 2020-05-11 RX ORDER — ROSUVASTATIN CALCIUM 5 MG/1
5 TABLET, COATED ORAL NIGHTLY
Qty: 90 TABLET | Refills: 1 | Status: SHIPPED | OUTPATIENT
Start: 2020-05-11 | End: 2020-11-11

## 2020-05-11 NOTE — TELEPHONE ENCOUNTER
From: Angelika Albert  To:  Constanza Mathis MD  Sent: 5/11/2020 2:42 PM CDT  Subject: Test Results Question    Hi Dr. Eleazar Duff,    I scheduled an appointment with you tomorrow at 8:00 am for my vagina but this is to follow up with the statin test. Yes, I am a

## 2020-05-11 NOTE — TELEPHONE ENCOUNTER
Routed to Dr. Suad Little  for advise, thanks. rx pended for Rosuvastatin 5 mg.        Future Appointments   Date Time Provider Nicole Gonsalez   5/12/2020  8:00 AM Radha Harley MD [facility]   7/20/2020 10:00 AM Radha Harley MD [facility]

## 2020-05-11 NOTE — TELEPHONE ENCOUNTER
Action Requested: Summary for Provider     []  Critical Lab, Recommendations Needed  [] Need Additional Advice  []   FYI    []   Need Orders  [] Need Medications Sent to Pharmacy  []  Other     SUMMARY: OV scheduled 5/12/20 for urinary/vaginal symptoms.

## 2020-05-12 ENCOUNTER — OFFICE VISIT (OUTPATIENT)
Dept: INTERNAL MEDICINE CLINIC | Facility: CLINIC | Age: 55
End: 2020-05-12
Payer: COMMERCIAL

## 2020-05-12 ENCOUNTER — APPOINTMENT (OUTPATIENT)
Dept: LAB | Facility: HOSPITAL | Age: 55
End: 2020-05-12
Attending: INTERNAL MEDICINE
Payer: COMMERCIAL

## 2020-05-12 VITALS
RESPIRATION RATE: 16 BRPM | BODY MASS INDEX: 21.39 KG/M2 | HEIGHT: 66.5 IN | WEIGHT: 134.69 LBS | TEMPERATURE: 98 F | DIASTOLIC BLOOD PRESSURE: 66 MMHG | HEART RATE: 77 BPM | SYSTOLIC BLOOD PRESSURE: 98 MMHG

## 2020-05-12 DIAGNOSIS — F17.200 TOBACCO USE DISORDER: ICD-10-CM

## 2020-05-12 DIAGNOSIS — N76.1 SUBACUTE VAGINITIS: ICD-10-CM

## 2020-05-12 DIAGNOSIS — E78.5 HYPERLIPIDEMIA, UNSPECIFIED HYPERLIPIDEMIA TYPE: ICD-10-CM

## 2020-05-12 DIAGNOSIS — R30.0 DYSURIA: Primary | ICD-10-CM

## 2020-05-12 DIAGNOSIS — R30.0 DYSURIA: ICD-10-CM

## 2020-05-12 PROCEDURE — 99214 OFFICE O/P EST MOD 30 MIN: CPT | Performed by: INTERNAL MEDICINE

## 2020-05-12 PROCEDURE — 81001 URINALYSIS AUTO W/SCOPE: CPT

## 2020-05-12 PROCEDURE — 87077 CULTURE AEROBIC IDENTIFY: CPT

## 2020-05-12 PROCEDURE — 87086 URINE CULTURE/COLONY COUNT: CPT

## 2020-05-12 RX ORDER — FLUCONAZOLE 150 MG/1
TABLET ORAL
Qty: 3 TABLET | Refills: 0 | Status: SHIPPED | OUTPATIENT
Start: 2020-05-12 | End: 2020-07-20

## 2020-05-12 NOTE — PATIENT INSTRUCTIONS
Problem List Items Addressed This Visit        Unprioritized    Dysuria - Primary     Recurrent episodes of urinary discomfort and pain. Patient has completed his Cipro for 5 days. Urine cultures–beta-hemolytic strep.   This would have been well treated w

## 2020-05-12 NOTE — ASSESSMENT & PLAN NOTE
Recurrent vaginal discharge– clear to white. No bleeding. Most likely related to antibiotic use. Diflucan as directed for the next 3 weeks once a week. Plenty of fluids. Start on a probiotic supplement.

## 2020-05-12 NOTE — ASSESSMENT & PLAN NOTE
Recurrent episodes of urinary discomfort and pain. Patient has completed his Cipro for 5 days. Urine cultures–beta-hemolytic strep. This would have been well treated with Cipro.   With repeat the urine culture at this time and follow-up

## 2020-05-12 NOTE — PROGRESS NOTES
HPI:    Patient ID: Suma Avelar is a 48year old female. URINE CULTURE   <10,000 CFU/ML Streptococcus agalactiae (Group B beta strep)Abnormal    Beta Hemolytic Strep are considered universally susceptible to ampicillin, penicillin and vancomycin. has been no fever. She is sexually active. There is no history of pyelonephritis. Associated symptoms include a discharge and urgency. Pertinent negatives include no chills, nausea, sweats or vomiting.  She has tried antibiotics and increased fluids for the Temp: 97.7 °F (36.5 °C)     Body mass index is 21.42 kg/m². PHYSICAL EXAM:   Physical Exam   Constitutional: She is oriented to person, place, and time. She appears well-developed and well-nourished.    HENT:   Right Ear: External ear normal.   Left Ea reviewed. ASSESSMENT/PLAN:     Problem List Items Addressed This Visit        Unprioritized    Tobacco use disorder     Patient has quit smoking at this time. Has not taken the Chantix.   She is advised to continue the albuterol at least once d FX#[id number]

## 2020-05-12 NOTE — ASSESSMENT & PLAN NOTE
Lipid panel and liver function test discussed. Continue on rosuvastatin at pains and if occurs start medications and that me know. Recheck labs after starting on medication in about 4 weeks. Orders in the system.

## 2020-05-12 NOTE — ASSESSMENT & PLAN NOTE
Patient has quit smoking at this time. Has not taken the Chantix. She is advised to continue the albuterol at least once daily if possible. Call if any other concerns.

## 2020-05-16 ENCOUNTER — TELEPHONE (OUTPATIENT)
Dept: INTERNAL MEDICINE CLINIC | Facility: CLINIC | Age: 55
End: 2020-05-16

## 2020-05-16 ENCOUNTER — PATIENT MESSAGE (OUTPATIENT)
Dept: INTERNAL MEDICINE CLINIC | Facility: CLINIC | Age: 55
End: 2020-05-16

## 2020-05-16 NOTE — TELEPHONE ENCOUNTER
From: Emely Stout  To: Zay Mendosa MD  Sent: 5/16/2020 9:19 AM CDT  Subject: Test Results Question    Hi Dr. Marleni Newman,    I see that the swab tests came back negative for yeast. I took the first diflucan.  I still have brownish discharge and irritatio

## 2020-05-16 NOTE — TELEPHONE ENCOUNTER
----- Message from Roberto Mata RN sent at 5/16/2020 10:12 AM CDT -----  Regarding: FW: Test Results Question  Contact: [phone]      ----- Message -----  From: Sergey Allen  Sent: 5/16/2020   9:19 AM CDT  To: Joanne Rn Triage  Subject: Test Results

## 2020-05-16 NOTE — TELEPHONE ENCOUNTER
Patient seen in [address] [place] [address] Rd 5/12, reports results were negative for yeast infection but continues with brown discharge and irritation, symptoms not worsening, no fever, pain with urination or new symptoms.  Patient requesting further recommendations for treatment or

## 2020-05-25 ENCOUNTER — PATIENT MESSAGE (OUTPATIENT)
Dept: INTERNAL MEDICINE CLINIC | Facility: CLINIC | Age: 55
End: 2020-05-25

## 2020-05-26 ENCOUNTER — NURSE TRIAGE (OUTPATIENT)
Dept: INTERNAL MEDICINE CLINIC | Facility: CLINIC | Age: 55
End: 2020-05-26

## 2020-05-26 DIAGNOSIS — R20.2 PARESTHESIA: Primary | ICD-10-CM

## 2020-05-26 NOTE — TELEPHONE ENCOUNTER
----- Message from Nicole Lombardo sent at 5/25/2020 12:06 PM CDT -----  Regarding: Other  Contact: [phone]  Hi Dr. Anil Sidhu,    I am having uncomfortable tingling, heat, in my feet and sometimes calves.  This used to happen before occasionally at nig

## 2020-05-26 NOTE — TELEPHONE ENCOUNTER
Action Requested: Summary for Provider     []  Critical Lab, Recommendations Needed  [] Need Additional Advice  []   FYI    []   Need Orders  [] Need Medications Sent to Pharmacy  []  Other     SUMMARY: reports having \"tingling and heat\" in her feet and

## 2020-05-28 ENCOUNTER — HOSPITAL ENCOUNTER (OUTPATIENT)
Dept: GENERAL RADIOLOGY | Facility: HOSPITAL | Age: 55
Discharge: HOME | End: 2020-05-28
Attending: INTERNAL MEDICINE
Payer: COMMERCIAL

## 2020-05-28 ENCOUNTER — TELEMEDICINE (OUTPATIENT)
Dept: INTERNAL MEDICINE CLINIC | Facility: CLINIC | Age: 55
End: 2020-05-28

## 2020-05-28 ENCOUNTER — LAB ENCOUNTER (OUTPATIENT)
Dept: LAB | Facility: HOSPITAL | Age: 55
End: 2020-05-28
Attending: INTERNAL MEDICINE
Payer: COMMERCIAL

## 2020-05-28 DIAGNOSIS — R20.2 PARESTHESIA OF BOTH FEET: ICD-10-CM

## 2020-05-28 DIAGNOSIS — R20.2 PARESTHESIA: ICD-10-CM

## 2020-05-28 DIAGNOSIS — R20.2 PARESTHESIA OF BOTH FEET: Primary | ICD-10-CM

## 2020-05-28 DIAGNOSIS — E78.5 HYPERLIPIDEMIA, UNSPECIFIED HYPERLIPIDEMIA TYPE: ICD-10-CM

## 2020-05-28 PROCEDURE — 85652 RBC SED RATE AUTOMATED: CPT

## 2020-05-28 PROCEDURE — 86038 ANTINUCLEAR ANTIBODIES: CPT

## 2020-05-28 PROCEDURE — 80061 LIPID PANEL: CPT

## 2020-05-28 PROCEDURE — 80053 COMPREHEN METABOLIC PANEL: CPT

## 2020-05-28 PROCEDURE — 73502 X-RAY EXAM HIP UNI 2-3 VIEWS: CPT | Performed by: INTERNAL MEDICINE

## 2020-05-28 PROCEDURE — 36415 COLL VENOUS BLD VENIPUNCTURE: CPT

## 2020-05-28 PROCEDURE — 82607 VITAMIN B-12: CPT

## 2020-05-28 PROCEDURE — 72110 X-RAY EXAM L-2 SPINE 4/>VWS: CPT | Performed by: INTERNAL MEDICINE

## 2020-05-28 PROCEDURE — 86431 RHEUMATOID FACTOR QUANT: CPT

## 2020-05-28 PROCEDURE — 82746 ASSAY OF FOLIC ACID SERUM: CPT

## 2020-05-28 PROCEDURE — 99214 OFFICE O/P EST MOD 30 MIN: CPT | Performed by: INTERNAL MEDICINE

## 2020-05-28 PROCEDURE — 85025 COMPLETE CBC W/AUTO DIFF WBC: CPT

## 2020-05-28 PROCEDURE — 82550 ASSAY OF CK (CPK): CPT

## 2020-05-28 NOTE — ASSESSMENT & PLAN NOTE
Gradually burning, tingling bilateral feet. Initially about a year to 2 back and seemed to respond to magnesium and over-the-counter vitamin supplements. These problems usually were nocturnal and associated with leg cramps.   Over the past 1 week she has

## 2020-05-28 NOTE — PATIENT INSTRUCTIONS
Problem List Items Addressed This Visit        Unprioritized    Paresthesia of both feet - Primary     Gradually burning, tingling bilateral feet.   Initially about a year to 2 back and seemed to respond to magnesium and over-the-counter vitamin supplements

## 2020-05-28 NOTE — PROGRESS NOTES
HPI:    Patient ID: Viki Suh is a 48year old female. Telehealth Verbal Consent   I conducted a telehealth visit with Viki Suh today, 05/28/20, which was completed using two-way, real-time interactive audio and video communication.  Parminder Mata burning lasting all day. No numbness. No back pain. But has had pain in the right hip.). Associated symptoms include arthralgias and numbness.  Pertinent negatives include no anorexia, change in bowel habit, coughing, diaphoresis, headaches, joint swelli and headaches. Hematological: Negative. Psychiatric/Behavioral: Negative. Negative for behavioral problems, decreased concentration, dysphoric mood and sleep disturbance. The patient is not nervous/anxious.              Current Outpatient Medications Visit        Unprioritized    Paresthesia of both feet - Primary     Gradually burning, tingling bilateral feet. Initially about a year to 2 back and seemed to respond to magnesium and over-the-counter vitamin supplements.   These problems usually were noc

## 2020-05-28 NOTE — TELEPHONE ENCOUNTER
See telemedicine encounter 5/28/20. From: Kimberlyn Becerra  To: Len Singleton MD  Sent: 5/25/2020 12:06 PM CDT  Subject: Other    Hi Dr. Praveen Fernandez,    I am having uncomfortable tingling, heat, in my feet and sometimes calves.  This used to happen before

## 2020-07-03 ENCOUNTER — APPOINTMENT (OUTPATIENT)
Dept: LAB | Facility: HOSPITAL | Age: 55
End: 2020-07-03
Attending: INTERNAL MEDICINE
Payer: COMMERCIAL

## 2020-07-03 ENCOUNTER — TELEPHONE (OUTPATIENT)
Dept: INTERNAL MEDICINE CLINIC | Facility: CLINIC | Age: 55
End: 2020-07-03

## 2020-07-03 DIAGNOSIS — R30.0 DYSURIA: ICD-10-CM

## 2020-07-03 DIAGNOSIS — R31.9 HEMATURIA, UNSPECIFIED TYPE: Primary | ICD-10-CM

## 2020-07-03 DIAGNOSIS — R31.9 HEMATURIA, UNSPECIFIED TYPE: ICD-10-CM

## 2020-07-03 LAB
BILIRUB UR QL: NEGATIVE
COLOR UR: YELLOW
GLUCOSE UR-MCNC: NEGATIVE MG/DL
KETONES UR-MCNC: NEGATIVE MG/DL
NITRITE UR QL STRIP.AUTO: NEGATIVE
PH UR: 6 [PH] (ref 5–8)
PROT UR-MCNC: 100 MG/DL
RBC #/AREA URNS AUTO: 1057 /HPF
SP GR UR STRIP: 1.01 (ref 1–1.03)
UROBILINOGEN UR STRIP-ACNC: <2
WBC #/AREA URNS AUTO: 752 /HPF

## 2020-07-03 PROCEDURE — 81001 URINALYSIS AUTO W/SCOPE: CPT | Performed by: INTERNAL MEDICINE

## 2020-07-03 PROCEDURE — 87077 CULTURE AEROBIC IDENTIFY: CPT

## 2020-07-03 PROCEDURE — 87086 URINE CULTURE/COLONY COUNT: CPT

## 2020-07-03 NOTE — TELEPHONE ENCOUNTER
Patient called, requesting orders for a urinalysis, as she believes she has a bladder infection. Patient declined setting up an appointment, she stated that she just wanted to do the test if possible.

## 2020-07-03 NOTE — TELEPHONE ENCOUNTER
Patient had dysuria last weekend 6/27/20 and then subsided. Today started again with burning on urination, frequency, urgency and now blood in urine. Asking for order for u/a and culture to see if she has an infection.    Forward to Dr. Luis meraz

## 2020-07-06 RX ORDER — AMOXICILLIN 875 MG/1
875 TABLET, COATED ORAL 2 TIMES DAILY
Qty: 10 TABLET | Refills: 0 | Status: SHIPPED | OUTPATIENT
Start: 2020-07-06 | End: 2020-11-10 | Stop reason: ALTCHOICE

## 2020-07-06 RX ORDER — AMOXICILLIN 875 MG/1
875 TABLET, COATED ORAL 2 TIMES DAILY
Qty: 14 TABLET | Refills: 0 | Status: SHIPPED | OUTPATIENT
Start: 2020-07-06 | End: 2020-11-10 | Stop reason: ALTCHOICE

## 2020-07-06 NOTE — TELEPHONE ENCOUNTER
the urine culture is in. Please advise if you want pt to continue taking Cipro? Collected:  7/3/2020  2:11 PM Status:  Final result Dx:  Dysuria;  Hematuria, unspecified type   Specimen Information: Urine, clean catch        URINE CULTURE 10,00

## 2020-07-06 NOTE — TELEPHONE ENCOUNTER
I have sent this patient to message already. If her symptoms are better, Cipro should have treated this. If her symptoms are not better we will need to change to amoxicillin–I will pend the prescription, you may send if she does not feel better.

## 2020-07-17 ENCOUNTER — TELEPHONE (OUTPATIENT)
Dept: INTERNAL MEDICINE CLINIC | Facility: CLINIC | Age: 55
End: 2020-07-17

## 2020-07-20 ENCOUNTER — PATIENT MESSAGE (OUTPATIENT)
Dept: INTERNAL MEDICINE CLINIC | Facility: CLINIC | Age: 55
End: 2020-07-20

## 2020-07-20 ENCOUNTER — TELEPHONE (OUTPATIENT)
Dept: INTERNAL MEDICINE CLINIC | Facility: CLINIC | Age: 55
End: 2020-07-20

## 2020-07-20 ENCOUNTER — TELEMEDICINE (OUTPATIENT)
Dept: INTERNAL MEDICINE CLINIC | Facility: CLINIC | Age: 55
End: 2020-07-20
Payer: COMMERCIAL

## 2020-07-20 DIAGNOSIS — E78.5 HYPERLIPIDEMIA, UNSPECIFIED HYPERLIPIDEMIA TYPE: ICD-10-CM

## 2020-07-20 DIAGNOSIS — R20.2 PARESTHESIA OF BOTH FEET: ICD-10-CM

## 2020-07-20 DIAGNOSIS — Z20.822 CLOSE EXPOSURE TO COVID-19 VIRUS: Primary | ICD-10-CM

## 2020-07-20 DIAGNOSIS — N76.1 SUBACUTE VAGINITIS: Primary | ICD-10-CM

## 2020-07-20 PROCEDURE — 99214 OFFICE O/P EST MOD 30 MIN: CPT | Performed by: INTERNAL MEDICINE

## 2020-07-20 RX ORDER — FLUCONAZOLE 150 MG/1
TABLET ORAL
Qty: 3 TABLET | Refills: 0 | Status: SHIPPED | OUTPATIENT
Start: 2020-07-20 | End: 2020-11-10 | Stop reason: ALTCHOICE

## 2020-07-20 NOTE — TELEPHONE ENCOUNTER
I will place an order for the testing. However would advise to hold off testing until her symptoms are a little bit more prominent. If no significant symptoms–testing may be back negative. I am placing the order.

## 2020-07-20 NOTE — PROGRESS NOTES
HPI:    Patient ID: Heather Meraz is a 48year old female. pt stated she went out for a bike with her friend today at 8:30 am and about an hour ago pt got her covid positive result.    Pt stated this morning they went for coffee and her friend felix lipid tests were reviewed and are high (Has been started on rosuvastatin which he seems to have tolerated but has developed some numbness tingling and pain in her calves which has been lasting all through the day over the past 7 to 10 days. ).  She has no hi headaches. Current Outpatient Medications   Medication Sig Dispense Refill   • fluconazole (DIFLUCAN) 150 MG Oral Tab 1 tablet by mouth every weekly 3 tablet 0   • FLUoxetine HCl 20 MG Oral Cap Take 1 capsule (20 mg total) by mouth daily.  30 cap Addressed This Visit        Unprioritized    Hyperlipidemia     Lipid panel and liver function tests were stable on rosuvastatin 5 mg daily. However patient had worsening foot numbness, tingling and burning and hence she discontinued her medication.   As d W Differential W Platelet [E]      Meds This Visit:  Requested Prescriptions     Signed Prescriptions Disp Refills   • fluconazole (DIFLUCAN) 150 MG Oral Tab 3 tablet 0     Si tablet by mouth every weekly       Imaging & Referrals:  None       #[id number]

## 2020-07-20 NOTE — TELEPHONE ENCOUNTER
Pt reports had video visit scheduled today for 10am, is waiting for appt. Dr Epifanio Cooper notified confirms already spoke with patient.

## 2020-07-20 NOTE — PATIENT INSTRUCTIONS
Problem List Items Addressed This Visit        Unprioritized    Hyperlipidemia     Lipid panel and liver function tests were stable on rosuvastatin 5 mg daily.   However patient had worsening foot numbness, tingling and burning and hence she discontinued he

## 2020-07-20 NOTE — ASSESSMENT & PLAN NOTE
And burning, tingling bilateral feet, usually by the end of the day. She did try magnesium with some improvement initially but then did not help. X-rays of the lumbar spine without any significant abnormality.   T68 and folic acid levels were normal.  No

## 2020-07-20 NOTE — ASSESSMENT & PLAN NOTE
Lipid panel and liver function tests were stable on rosuvastatin 5 mg daily. However patient had worsening foot numbness, tingling and burning and hence she discontinued her medication.   As discontinuation did not change outcome of her burning and tinglin

## 2020-07-20 NOTE — ASSESSMENT & PLAN NOTE
Recurrent episodes of vaginal discharge, sometimes brownish and reddish. She was seen by gynecology at [facility] and started on Vagifem. Ultrasound of the pelvis did not show any abnormalities.   Her symptoms of discharge improved but she has had at least 2 UT

## 2020-07-20 NOTE — TELEPHONE ENCOUNTER
----- Message from Nicole Lombardo sent at 7/20/2020  3:29 PM CDT -----  Regarding: Visit Follow-up Question  Contact: [phone]  Hi Dr. Jefferson Amato,    After telling you I have no COVID symptoms, I now have a headache.  I would rate it a 3-4 on a scale of

## 2020-07-20 NOTE — TELEPHONE ENCOUNTER
From: Kimberlyn Becerra  To: Len Singleton MD  Sent: 7/20/2020 3:29 PM CDT  Subject: Visit [facility] Eve Fernandez,    After telling you I have no COVID symptoms, I now have a headache. I would rate it a 3-4 on a scale of 10.  Made an appointmen

## 2020-07-20 NOTE — TELEPHONE ENCOUNTER
Patient reports had phone visit today with Dr Santo Westbrook to discuss covid exposure, was not having symptoms earlier but has started now with headache, was wanting to know if able to have covid test ordered, please advise.  Patient was advised if she does not qu

## 2020-07-23 ENCOUNTER — LAB ENCOUNTER (OUTPATIENT)
Dept: LAB | Facility: HOSPITAL | Age: 55
End: 2020-07-23
Attending: INTERNAL MEDICINE
Payer: COMMERCIAL

## 2020-07-23 DIAGNOSIS — Z20.822 CLOSE EXPOSURE TO COVID-19 VIRUS: ICD-10-CM

## 2020-07-23 LAB — SARS-COV-2 RNA RESP QL NAA+PROBE: NOT DETECTED

## 2020-08-04 ENCOUNTER — TELEPHONE (OUTPATIENT)
Dept: INTERNAL MEDICINE CLINIC | Facility: CLINIC | Age: 55
End: 2020-08-04

## 2020-08-04 NOTE — TELEPHONE ENCOUNTER
Dr. Jenna Dickson please see pt mychart message below. She stated that she will like some medication to help her with all the stress she is going through. She feels she might have depression as she finds no enjoyment in anything.   Pt was advised a visit is needed

## 2020-08-06 RX ORDER — FLUOXETINE HYDROCHLORIDE 20 MG/1
20 CAPSULE ORAL DAILY
Qty: 30 CAPSULE | Refills: 3 | Status: SHIPPED | OUTPATIENT
Start: 2020-08-06 | End: 2020-11-10

## 2020-08-06 NOTE — TELEPHONE ENCOUNTER
Pt has been contacted. She prefers meds to counselling,will call me back if changes her mind. Trial of prozac discussed.   See me in 4-5 weeks

## 2020-09-03 ENCOUNTER — NURSE TRIAGE (OUTPATIENT)
Dept: INTERNAL MEDICINE CLINIC | Facility: CLINIC | Age: 55
End: 2020-09-03

## 2020-09-03 NOTE — TELEPHONE ENCOUNTER
Action Requested: Summary for Provider     []  Critical Lab, Recommendations Needed  [x] Need Additional Advice  []   FYI    []   Need Orders  [] Need Medications Sent to Pharmacy  []  Other     SUMMARY: pt asking doctor's recommendations.  States yesterday

## 2020-09-04 RX ORDER — MONTELUKAST SODIUM 10 MG/1
10 TABLET ORAL NIGHTLY
Qty: 30 TABLET | Refills: 2 | Status: SHIPPED | OUTPATIENT
Start: 2020-09-04 | End: 2020-11-10

## 2020-09-04 NOTE — TELEPHONE ENCOUNTER
Spoke with patient. Rx for singulair sent to [place] per patient request. Advised to call if no improvement in symptoms.

## 2020-09-04 NOTE — TELEPHONE ENCOUNTER
Advised to take allergy medications like Zyrtec 10 mg 1 tablet once daily over-the-counter and start on Singulair 10 mg 1 tablet once daily, 30 days, 2 refills.   Call if symptoms do not get better

## 2020-11-11 ENCOUNTER — OFFICE VISIT (OUTPATIENT)
Dept: INTERNAL MEDICINE CLINIC | Facility: CLINIC | Age: 55
End: 2020-11-11
Payer: COMMERCIAL

## 2020-11-11 ENCOUNTER — TELEPHONE (OUTPATIENT)
Dept: OPTOMETRY | Facility: CLINIC | Age: 55
End: 2020-11-11

## 2020-11-11 VITALS
SYSTOLIC BLOOD PRESSURE: 101 MMHG | WEIGHT: 135.5 LBS | HEART RATE: 90 BPM | RESPIRATION RATE: 20 BRPM | HEIGHT: 66.5 IN | TEMPERATURE: 98 F | BODY MASS INDEX: 21.52 KG/M2 | DIASTOLIC BLOOD PRESSURE: 70 MMHG

## 2020-11-11 DIAGNOSIS — J44.9 CHRONIC OBSTRUCTIVE PULMONARY DISEASE, UNSPECIFIED COPD TYPE (HCC): ICD-10-CM

## 2020-11-11 DIAGNOSIS — N76.1 SUBACUTE VAGINITIS: Primary | ICD-10-CM

## 2020-11-11 PROCEDURE — 3074F SYST BP LT 130 MM HG: CPT | Performed by: INTERNAL MEDICINE

## 2020-11-11 PROCEDURE — 96127 BRIEF EMOTIONAL/BEHAV ASSMT: CPT | Performed by: INTERNAL MEDICINE

## 2020-11-11 PROCEDURE — 99214 OFFICE O/P EST MOD 30 MIN: CPT | Performed by: INTERNAL MEDICINE

## 2020-11-11 PROCEDURE — 3078F DIAST BP <80 MM HG: CPT | Performed by: INTERNAL MEDICINE

## 2020-11-11 PROCEDURE — 3008F BODY MASS INDEX DOCD: CPT | Performed by: INTERNAL MEDICINE

## 2020-11-11 RX ORDER — ROSUVASTATIN CALCIUM 5 MG/1
5 TABLET, COATED ORAL NIGHTLY
Qty: 90 TABLET | Refills: 1 | Status: SHIPPED
Start: 2020-11-11 | End: 2021-01-08

## 2020-11-11 RX ORDER — FLUCONAZOLE 150 MG/1
150 TABLET ORAL ONCE
Qty: 1 TABLET | Refills: 0 | Status: SHIPPED | OUTPATIENT
Start: 2020-11-11 | End: 2020-11-11

## 2020-11-11 RX ORDER — AMOXICILLIN AND CLAVULANATE POTASSIUM 875; 125 MG/1; MG/1
1 TABLET, FILM COATED ORAL 2 TIMES DAILY
Qty: 10 TABLET | Refills: 0 | Status: SHIPPED | OUTPATIENT
Start: 2020-11-11 | End: 2021-01-08 | Stop reason: ALTCHOICE

## 2020-11-11 RX ORDER — ESTRADIOL 10 UG/1
1 INSERT VAGINAL
COMMUNITY
Start: 2020-06-09

## 2020-11-11 NOTE — ASSESSMENT & PLAN NOTE
Mild COPD with slightly elevated TLC and slightly low DLCO. Patient has completely quit smoking and so this should stabilize and not cause any further medical issues in the future.   No treatment needed at this time as she did not show any response to bron

## 2020-11-11 NOTE — TELEPHONE ENCOUNTER
We will send message to [facility] to order patients contacts. She is aware he is out of the office for 2 weeks. We will call pt when contacts come in. Routed to [address] [place] [address]

## 2020-11-11 NOTE — PATIENT INSTRUCTIONS
Problem List Items Addressed This Visit        Unprioritized    COPD (chronic obstructive pulmonary disease) ([facility] [address]     Mild COPD with slightly elevated TLC and slightly low DLCO.   Patient has completely quit smoking and so this should stabilize and not cau

## 2020-11-11 NOTE — TELEPHONE ENCOUNTER
Pt came to  [address] [place] [address] wanting to speak to Dr Piedad Amato regarding ordering CL's. Please call to advise.

## 2020-11-11 NOTE — PROGRESS NOTES
HPI:    Patient ID: Ruben Wharton is a 55year old female.     Vaginal Problem (states for the last year has noticed foul vaginal smell; admits she has had brown vaginal discharge for at least the last year and has not multiple cultures done, most recen cul-de-sac. The right ovary is normal is size, measuring 0.9 x 2.3 x 0.9 cm. The left ovary is normal is size, measuring 2 x 1.5 x 0.7 cm. IMPRESSION:  No abnormal endometrial thickening. Unremarkable sonographic appearance of the ovaries.       [facility] hemolytic Streptococcus, group B  Heavy growth  Penicillin and ampicillin are drugs of choice for treatment of  beta-hemolytic streptococcal infections.  Susceptibility testing of  penicillins and other beta-lactam agents approved by the FDA for  treatment bleeding. Musculoskeletal: Negative. Negative for back pain. Skin: Negative. Allergic/Immunologic: Negative. Neurological: Negative. Hematological: Negative. Psychiatric/Behavioral: Negative.              Current Outpatient Medications   Me content normal.   Nursing note and vitals reviewed.              ASSESSMENT/PLAN:     Problem List Items Addressed This Visit        Unprioritized    COPD (chronic obstructive pulmonary disease) ([address]     Mild COPD with slightly elevated TLC and slightly lo Referrals:  None       #[id number]

## 2020-11-11 NOTE — ASSESSMENT & PLAN NOTE
Recurrent episodes of vaginal discharge, brownish, sometimes malodorous. She was seen by gynecology at [facility], started on Vagifem but continued to have symptoms and hence had vaginal cultures done that showed heavy growth of group B strep.   She was treated

## 2020-11-13 LAB — TRICHOMONAS SCREEN: NEGATIVE

## 2020-11-16 NOTE — TELEPHONE ENCOUNTER
Spoke with patient. She was upset that Dr. Niraj Monson will not be in the office until January 2021. I told her that he will be back on 11/23/20. She says she just wants to order contacts.     She said she just wants a copy of her most recent contacts and she

## 2020-11-16 NOTE — TELEPHONE ENCOUNTER
Pt upset regarding msg below, asking for contacts to be ordered by other doctor. Please call thank you.

## 2020-12-13 ENCOUNTER — OFFICE VISIT (OUTPATIENT)
Dept: FAMILY MEDICINE CLINIC | Facility: CLINIC | Age: 55
End: 2020-12-13
Payer: COMMERCIAL

## 2020-12-13 ENCOUNTER — TELEPHONE (OUTPATIENT)
Dept: INTERNAL MEDICINE CLINIC | Facility: CLINIC | Age: 55
End: 2020-12-13

## 2020-12-13 ENCOUNTER — APPOINTMENT (OUTPATIENT)
Dept: GENERAL RADIOLOGY | Facility: HOSPITAL | Age: 55
End: 2020-12-13
Attending: EMERGENCY MEDICINE
Payer: COMMERCIAL

## 2020-12-13 ENCOUNTER — HOSPITAL ENCOUNTER (EMERGENCY)
Facility: HOSPITAL | Age: 55
Discharge: HOME | End: 2020-12-13
Attending: EMERGENCY MEDICINE
Payer: COMMERCIAL

## 2020-12-13 VITALS
BODY MASS INDEX: 21.19 KG/M2 | WEIGHT: 135 LBS | OXYGEN SATURATION: 97 % | SYSTOLIC BLOOD PRESSURE: 117 MMHG | DIASTOLIC BLOOD PRESSURE: 81 MMHG | HEIGHT: 67 IN | TEMPERATURE: 97 F | HEART RATE: 78 BPM

## 2020-12-13 VITALS
TEMPERATURE: 99 F | OXYGEN SATURATION: 100 % | HEART RATE: 70 BPM | RESPIRATION RATE: 15 BRPM | DIASTOLIC BLOOD PRESSURE: 76 MMHG | SYSTOLIC BLOOD PRESSURE: 111 MMHG

## 2020-12-13 DIAGNOSIS — R00.2 PALPITATIONS: ICD-10-CM

## 2020-12-13 DIAGNOSIS — R06.02 SHORTNESS OF BREATH: Primary | ICD-10-CM

## 2020-12-13 DIAGNOSIS — R06.00 DYSPNEA, UNSPECIFIED TYPE: Primary | ICD-10-CM

## 2020-12-13 DIAGNOSIS — R07.9 CHEST PAIN OF UNCERTAIN ETIOLOGY: ICD-10-CM

## 2020-12-13 PROCEDURE — 36415 COLL VENOUS BLD VENIPUNCTURE: CPT

## 2020-12-13 PROCEDURE — 93005 ELECTROCARDIOGRAM TRACING: CPT

## 2020-12-13 PROCEDURE — 80048 BASIC METABOLIC PNL TOTAL CA: CPT | Performed by: EMERGENCY MEDICINE

## 2020-12-13 PROCEDURE — 93010 ELECTROCARDIOGRAM REPORT: CPT | Performed by: EMERGENCY MEDICINE

## 2020-12-13 PROCEDURE — 85379 FIBRIN DEGRADATION QUANT: CPT | Performed by: EMERGENCY MEDICINE

## 2020-12-13 PROCEDURE — 84484 ASSAY OF TROPONIN QUANT: CPT | Performed by: EMERGENCY MEDICINE

## 2020-12-13 PROCEDURE — 71045 X-RAY EXAM CHEST 1 VIEW: CPT | Performed by: EMERGENCY MEDICINE

## 2020-12-13 PROCEDURE — 3074F SYST BP LT 130 MM HG: CPT | Performed by: NURSE PRACTITIONER

## 2020-12-13 PROCEDURE — 85025 COMPLETE CBC W/AUTO DIFF WBC: CPT | Performed by: EMERGENCY MEDICINE

## 2020-12-13 PROCEDURE — 3079F DIAST BP 80-89 MM HG: CPT | Performed by: NURSE PRACTITIONER

## 2020-12-13 PROCEDURE — 3008F BODY MASS INDEX DOCD: CPT | Performed by: NURSE PRACTITIONER

## 2020-12-13 PROCEDURE — 83880 ASSAY OF NATRIURETIC PEPTIDE: CPT | Performed by: EMERGENCY MEDICINE

## 2020-12-13 PROCEDURE — 99284 EMERGENCY DEPT VISIT MOD MDM: CPT

## 2020-12-13 NOTE — ED NOTES
PT safe to DC home per MD. Brandon Velazquez to dress self. DC teaching done, pt verbalizes understanding. Ambulatory with steady gait to exit.

## 2020-12-13 NOTE — ED PROVIDER NOTES
Patient Seen in: [facility] AND [facility] Emergency Department    History   Patient presents with:  Headache  Chest Pain Angina    Stated Complaint: headaches for 2 months/ sob     HPI    Patient is here with basically concern about Covid.   She was seen in the Amoxicillin-Pot Clavulanate 875-125 MG Oral Tab,  Take 1 tablet by mouth 2 (two) times daily. Patient not taking: Reported on 12/13/2020    Rosuvastatin Calcium 5 MG Oral Tab,  Take 1 tablet (5 mg total) by mouth nightly.   Patient not taking: Reported on BASIC METABOLIC PANEL (8) - Abnormal; Notable for the following components:       Result Value    Sodium 133 (*)     BUN/CREA Ratio 9.5 (*)     Calculated Osmolality 273 (*)     All other components within normal limits   TROPONIN I - Normal   D-DIMER - Medication List

## 2020-12-13 NOTE — ED INITIAL ASSESSMENT (HPI)
Pt reports Headache x 2 months   Chest pain and pressure  Pt reports increased stress in her life pt is tearful during triage

## 2020-12-13 NOTE — PROGRESS NOTES
Pt presents to [facility] for headache on and off for 2 months, shortness of breath and pressure in chest that started yesterday. She would like to be tested for COVID. Reports she is out of breath walking up stairs.  Denies cough, congestion, sore throat, GI symp

## 2020-12-15 NOTE — TELEPHONE ENCOUNTER
Message # [phone]         2020 10:56a   [COLLEENS]  To:  From:  SURESH Allen MD:  Phone#:  [phone]  [facility] [phone]  1965 RE  PT HAS HEADACHE, AND SOB, NEEDS ADVICE Pa

## 2020-12-17 ENCOUNTER — TELEPHONE (OUTPATIENT)
Dept: OPTOMETRY | Facility: CLINIC | Age: 55
End: 2020-12-17

## 2020-12-17 NOTE — TELEPHONE ENCOUNTER
LM that My Chart feature to retrieve RX might have been altered in the upgrade. I can mail to home or fax to home or can fax to [facility] of her choice. LMTCB. I extended RX for 6 months due to Covid as wait to get in for EE is nearly two months.

## 2021-01-08 ENCOUNTER — OFFICE VISIT (OUTPATIENT)
Dept: INTERNAL MEDICINE CLINIC | Facility: CLINIC | Age: 56
End: 2021-01-08
Payer: COMMERCIAL

## 2021-01-08 VITALS
WEIGHT: 133.88 LBS | HEART RATE: 88 BPM | HEIGHT: 67 IN | TEMPERATURE: 99 F | BODY MASS INDEX: 21.01 KG/M2 | DIASTOLIC BLOOD PRESSURE: 74 MMHG | SYSTOLIC BLOOD PRESSURE: 108 MMHG | RESPIRATION RATE: 18 BRPM

## 2021-01-08 DIAGNOSIS — Z78.0 MENOPAUSE: ICD-10-CM

## 2021-01-08 DIAGNOSIS — J44.9 CHRONIC OBSTRUCTIVE PULMONARY DISEASE, UNSPECIFIED COPD TYPE (HCC): ICD-10-CM

## 2021-01-08 DIAGNOSIS — Z12.31 VISIT FOR SCREENING MAMMOGRAM: ICD-10-CM

## 2021-01-08 DIAGNOSIS — Z00.00 WELLNESS EXAMINATION: ICD-10-CM

## 2021-01-08 DIAGNOSIS — E78.5 HYPERLIPIDEMIA, UNSPECIFIED HYPERLIPIDEMIA TYPE: Primary | ICD-10-CM

## 2021-01-08 DIAGNOSIS — D22.9 ATYPICAL NEVI: ICD-10-CM

## 2021-01-08 DIAGNOSIS — F32.A MILD DEPRESSIVE DISORDER: ICD-10-CM

## 2021-01-08 DIAGNOSIS — Z00.00 ROUTINE PHYSICAL EXAMINATION: ICD-10-CM

## 2021-01-08 PROCEDURE — 3074F SYST BP LT 130 MM HG: CPT | Performed by: INTERNAL MEDICINE

## 2021-01-08 PROCEDURE — 3078F DIAST BP <80 MM HG: CPT | Performed by: INTERNAL MEDICINE

## 2021-01-08 PROCEDURE — 3008F BODY MASS INDEX DOCD: CPT | Performed by: INTERNAL MEDICINE

## 2021-01-08 PROCEDURE — 99396 PREV VISIT EST AGE 40-64: CPT | Performed by: INTERNAL MEDICINE

## 2021-01-08 RX ORDER — FLUOXETINE 10 MG/1
10 CAPSULE ORAL DAILY
Qty: 30 CAPSULE | Refills: 3 | Status: SHIPPED | OUTPATIENT
Start: 2021-01-08 | End: 2021-03-06

## 2021-01-08 NOTE — ASSESSMENT & PLAN NOTE
History of mild COPD with slightly elevated TLC and slightly low DLCO. She has completely quit smoking since April 2020. Advised to consider using an inhaler especially with exercise–albuterol 2 puffs about half hour prior to exertion.   We will follow-up

## 2021-01-08 NOTE — ASSESSMENT & PLAN NOTE
History of hyperlipidemia, patient has been on rosuvastatin in the past, discontinued medication and is overdue for labs. We will follow-up and if remains elevated will need to consider starting back on medication.

## 2021-01-08 NOTE — PATIENT INSTRUCTIONS
Problem List Items Addressed This Visit        Unprioritized    COPD (chronic obstructive pulmonary disease) ([address]     History of mild COPD with slightly elevated TLC and slightly low DLCO. She has completely quit smoking since April 2020.   Advised to con Years due on 09/21/2021    Immunizations-  Immunization History   Administered Date(s) Administered   • FLULAVAL 6 months & older 0.5 ml Prefilled syringe (20596) 09/21/2018   • FLUZONE 6 months and older PFS 0.5 ml (17919) 09/21/2018   • Influenza 11/11/2

## 2021-01-08 NOTE — ASSESSMENT & PLAN NOTE
History of mild depressive disorder in the past.  Did try Wellbutrin which did not help. She then tried Prozac and then discontinued as she had some nausea. Would like to start on some medications at this time due to multiple stressors.   Advised to start

## 2021-01-08 NOTE — PROGRESS NOTES
HPI:   Heather Meraz is a 55year old female who presents for an Annual Health Visit.      Allergies:   No Known Allergies    CURRENT MEDICATIONS   Current Outpatient Medications   Medication Sig Dispense Refill   • Amoxicillin-Pot Clavulanate 078 History    Social History Narrative      Not on file       REVIEW OF SYSTEMS:     Review of Systems   Constitutional: Negative. HENT: Negative. Eyes: Negative. Respiratory: Negative. Cardiovascular: Negative. Gastrointestinal: Negative.     E wheezing or rales. Chest:      Chest wall: No mass or tenderness. Breasts: Breasts are symmetrical.         Right: No inverted nipple, mass, nipple discharge, skin change or tenderness.          Left: No inverted nipple, mass, nipple discharge, skin normal and symmetric. Psychiatric:         Mood and Affect: Mood and affect normal.         Behavior: Behavior normal.         Thought Content:  Thought content normal.          ASSESSMENT AND PLAN:   Jennifer Freedman was seen today for physical.    Diagnoses and medications at this time due to multiple stressors. Advised to start on Prozac 10 mg 1 capsule once daily with breakfast.  Call if unable to tolerate medication. May consider behavioral health guidance counseling if necessary.          Relevant Medication Atypical nevi        Relevant Orders    DERM - INTERNAL             The patient indicates understanding of these issues and agrees to the plan.     Problem List:  Patient Active Problem List:     Mild depressive disorder ([address]     Tobacco use disorder     K

## 2021-01-08 NOTE — ASSESSMENT & PLAN NOTE
Normal exam.  Labs as ordered. Skin check normal.  Multiple scattered nevi present, advised to follow-up with Dr. Bishnu Perez for yearly skin check. Breast exam completed–no palpable abnormalities, discharge from the nipples or axillary adenopathy.   No cervic

## 2021-01-20 ENCOUNTER — OFFICE VISIT (OUTPATIENT)
Dept: FAMILY MEDICINE CLINIC | Facility: CLINIC | Age: 56
End: 2021-01-20
Payer: COMMERCIAL

## 2021-01-20 VITALS
WEIGHT: 133 LBS | DIASTOLIC BLOOD PRESSURE: 76 MMHG | TEMPERATURE: 98 F | SYSTOLIC BLOOD PRESSURE: 113 MMHG | OXYGEN SATURATION: 98 % | RESPIRATION RATE: 14 BRPM | HEART RATE: 90 BPM | BODY MASS INDEX: 20.88 KG/M2 | HEIGHT: 67 IN

## 2021-01-20 DIAGNOSIS — Z11.52 ENCOUNTER FOR SCREENING FOR COVID-19: Primary | ICD-10-CM

## 2021-01-20 PROCEDURE — 3008F BODY MASS INDEX DOCD: CPT | Performed by: NURSE PRACTITIONER

## 2021-01-20 PROCEDURE — 99212 OFFICE O/P EST SF 10 MIN: CPT | Performed by: NURSE PRACTITIONER

## 2021-01-20 PROCEDURE — 3074F SYST BP LT 130 MM HG: CPT | Performed by: NURSE PRACTITIONER

## 2021-01-20 PROCEDURE — 3078F DIAST BP <80 MM HG: CPT | Performed by: NURSE PRACTITIONER

## 2021-01-20 NOTE — PATIENT INSTRUCTIONS
Coronavirus Disease 2019 (COVID-19): Caring for Yourself or Others  If you or a household member have symptoms of COVID-19, follow these guidelines for preventing spread of the virus, and managing symptoms.   If you think you have COVID-19 symptoms  · Sta · Tell the healthcare staff about recent travel. This includes local travel on public transport. Staff may need to find other people you have been in contact with. · Follow all instructions the healthcare staff give you.     If you have been diagnosed with The FDA has approved a vaccine to prevent COVID-19 in people 16 years and older who are not pregnant or breastfeeding. It's not currently available to the entire public.  The first phase of vaccine roll-out will go to healthcare staff and residents of long- If you've had confirmed COVID-19, your healthcare team may ask you to consider donating your plasma. This is called COVID-19 convalescent plasma donation.  Plasma from people fully recovered from COVID-19 may contain antibodies to help fight COVID-19 in peo Your limits are different if you've had COVID-19 in the last 3 months but are fully recovered without symptoms and you have been exposed to someone with COVID-19. If you are symptom-free, you don't need to stay home away from others or be retested.  The CDC When you return to public settings  When you are well enough to go outside your home, consider the CDC's guidance on cloth face masks:     · The CDC advises all people over age 2 to wear cloth face masks in public settings when around people outside of the © 2289-5655 [employer]. All rights reserved. This information is not intended as a substitute for professional medical care. Always follow your healthcare professional's instructions.

## 2021-01-20 NOTE — PROGRESS NOTES
CHIEF COMPLAINT:   Patient presents with:  Covid-19 Test: Requests COVID test.  Asymptomatic but daughter w/ respiratory Sxs- her COVID test is pending. HPI:   Winston Cardenas is a 55year old female who presents for Covid 19 screening.   No known co CARDIOVASCULAR: denies chest pain or palpitations   GI: denies N/V/C or abdominal pain  NEURO: Denies headaches or dizziness    EXAM:   /76   Pulse 90   Temp 98.2 °F (36.8 °C) (Tympanic)   Resp 14   Ht 5' 7\" (1.702 m)   Wt 133 lb (60.3 kg)   LMP 11/ · Stay away from work, school, and public places. Limit physical contact with family members. Limit visitors. Don't kiss anyone or share eating or drinking utensils. Clean surfaces you touch with disinfectant.  This is to help prevent the virus from spreadi · If you need to go to a hospital or clinic, expect that the healthcare staff will wear protective equipment such as masks, gowns, gloves, and eye protection. You may be advised to wait in or enter through a separate area.  This is to prevent the possible v The FDA has approved an antiviral medicine called remdesivir for people in the hospital. It is for people 12 years and older who weigh more than about 88 pounds (40 kgs).  Remdesivir is approved only for people who need to be treated in the hospital. In cer Home care for a sick person   · Follow all instructions from healthcare staff. · Wash your hands often. · Wear protective clothing as advised. · Make sure the sick person wears a mask.  If they can't wear a mask, don't stay in the same room with the pers 2. Your symptoms such as cough or trouble breathing have improved. 3. It has been at least 10 days since your first symptoms started. Talk with your healthcare provider before you leave home. Tell them if the 3 things above are true for you.  They may tel · Anyone who is unconscious or unable to remove the face covering without help.  See the CDC's guidance on who should not wear a face mask.     When to call your healthcare provider  Call your healthcare provider right away if a sick person has any of these

## 2021-01-21 LAB — SARS-COV-2 RNA RESP QL NAA+PROBE: NOT DETECTED

## 2021-03-04 ENCOUNTER — PATIENT MESSAGE (OUTPATIENT)
Dept: INTERNAL MEDICINE CLINIC | Facility: CLINIC | Age: 56
End: 2021-03-04

## 2021-03-05 NOTE — TELEPHONE ENCOUNTER
I do not see recent documentation that the dose was increased to 20 mg prozac. Requested further info from the patient. Will await response.

## 2021-03-05 NOTE — TELEPHONE ENCOUNTER
From: Sara Leyva  To: Marita Bobo MD  Sent: 3/4/2021 5:02 PM CST  Subject: Prescription Question    Hi Dr Fidelia Lopez     I am now taking 20 mg of Prozac daily so I am out early. Can you prescribe me some more?     Thanks,  [employer]

## 2021-03-06 RX ORDER — FLUOXETINE HYDROCHLORIDE 20 MG/1
20 CAPSULE ORAL DAILY
Qty: 90 CAPSULE | Refills: 1 | Status: SHIPPED | OUTPATIENT
Start: 2021-03-06 | End: 2021-07-23

## 2021-03-09 ENCOUNTER — TELEPHONE (OUTPATIENT)
Dept: INTERNAL MEDICINE CLINIC | Facility: CLINIC | Age: 56
End: 2021-03-09

## 2021-03-09 ENCOUNTER — PATIENT MESSAGE (OUTPATIENT)
Dept: INTERNAL MEDICINE CLINIC | Facility: CLINIC | Age: 56
End: 2021-03-09

## 2021-03-09 NOTE — TELEPHONE ENCOUNTER
Patient reports in Nov followed up with Dr Jenna Dickson about ongoing vaginal symptoms. For the last few days has been experiencing discharge, itching and general vaginal discomfort. Denied painful urination, odor, frequency, reports no other symptoms.  Reports w

## 2021-03-09 NOTE — TELEPHONE ENCOUNTER
----- Message from Nicole Lombardo sent at 3/9/2021 11:37 AM CST -----  Regarding: RE: Other  Contact: [phone]  Thanks Mary Ellen! I hope I get on the list.    On another note, I have ongoing vagina issues.  I once again have non smelly discharge, some i anyone age 59 and under. Right now there are no available appointments for the 1st dose vaccination at [facility]. Once [facility] gets more vaccines then more appointments will become available.      You can get our most up to date vaccine information at https

## 2021-03-11 RX ORDER — AMOXICILLIN AND CLAVULANATE POTASSIUM 875; 125 MG/1; MG/1
1 TABLET, FILM COATED ORAL 2 TIMES DAILY
Qty: 14 TABLET | Refills: 0 | Status: SHIPPED | OUTPATIENT
Start: 2021-03-11 | End: 2021-03-18

## 2021-03-12 ENCOUNTER — PATIENT MESSAGE (OUTPATIENT)
Dept: INTERNAL MEDICINE CLINIC | Facility: CLINIC | Age: 56
End: 2021-03-12

## 2021-03-12 NOTE — TELEPHONE ENCOUNTER
See TE 3-9-21, RN already spoke with pt.        Future Appointments   Date Time Provider Nicole Gonsalez   3/16/2021  8:00 AM Khalif Meade [facility], [place]

## 2021-03-12 NOTE — TELEPHONE ENCOUNTER
Spoke with the patient,verified full name and , informed her of message below. Patient has no further questions.

## 2021-03-12 NOTE — TELEPHONE ENCOUNTER
Reviewed the chart,we had treated her with augmentin 875 mgs bid x 7 days for gr b strep-refaxed meds.     Will need to be seen if does not improve in 2 weeks

## 2021-03-12 NOTE — TELEPHONE ENCOUNTER
From: Viki Suh  To: Johanna Barrios MD  Sent: 3/12/2021 8:49 AM CST  Subject: Prescription Question    Hi there,     I have called several times this week bout getting a Cipro prescription for my vagina issues.  To recap, I have same symptoms as last

## 2021-03-16 ENCOUNTER — OFFICE VISIT (OUTPATIENT)
Dept: OPTOMETRY | Facility: CLINIC | Age: 56
End: 2021-03-16
Payer: COMMERCIAL

## 2021-03-16 DIAGNOSIS — H52.203 MYOPIA WITH ASTIGMATISM AND PRESBYOPIA, BILATERAL: Primary | ICD-10-CM

## 2021-03-16 DIAGNOSIS — H52.4 MYOPIA WITH ASTIGMATISM AND PRESBYOPIA, BILATERAL: Primary | ICD-10-CM

## 2021-03-16 DIAGNOSIS — H52.13 MYOPIA WITH ASTIGMATISM AND PRESBYOPIA, BILATERAL: Primary | ICD-10-CM

## 2021-03-16 PROCEDURE — 92015 DETERMINE REFRACTIVE STATE: CPT | Performed by: OPTOMETRIST

## 2021-03-16 PROCEDURE — 92012 INTRM OPH EXAM EST PATIENT: CPT | Performed by: OPTOMETRIST

## 2021-03-16 NOTE — PROGRESS NOTES
Prem George is a 55year old female.     HPI:     HPI     Patient is in for an annual contact lens exam. She has Armen Proclear toric lenses in monovision and wants to be able to see better far and near with them    Last edited by Ilene Campos OD on (Patient not taking: Reported on 3/16/2021 ) 14 tablet 0   • Estradiol 10 MCG Vaginal Tab Place 1 tablet vaginally.  (Patient not taking: Reported on 3/16/2021 )         Allergies:  No Known Allergies    ROS:     ROS     Negative for: Constitutional, Gastro Sphere Cylinder Axis Dist VA Add    Right -6.50 -2.00 180 20/20 +2.25    Left -7.25 -2.00 180 20/20 +2.25    Type: Progressive bifocal            Contact Lens Exam     Current Contact Lens Rx       Brand Base Curve Diameter Sphere Cylinder Axis    Ri

## 2021-03-16 NOTE — PATIENT INSTRUCTIONS
Myopia with astigmatism and presbyopia, bilateral  Gave glasses RX and order trials of new toric RX.

## 2021-03-16 NOTE — PROGRESS NOTES
Netta Goldstein is a 55year old female.     HPI:     HPI     Patient is in for an annual contact lens exam. She has Armen Proclear toric lenses in monovision and wants to be able to see better far and near with them    Last edited by Linh Frausto, OD on (Patient not taking: Reported on 3/16/2021 ) 14 tablet 0   • Estradiol 10 MCG Vaginal Tab Place 1 tablet vaginally.  (Patient not taking: Reported on 3/16/2021 )         Allergies:  No Known Allergies    ROS:     ROS     Negative for: Constitutional, Gastro +2.25    Left -7.25 -2.00 180 20/20 +2.25          Final Rx       Sphere Cylinder Axis Dist VA Add    Right -6.50 -2.00 180 20/20 +2.25    Left -7.25 -2.00 180 20/20 +2.25    Type: Progressive bifocal            Contact Lens Exam     Current Contact Lens R

## 2021-03-16 NOTE — PROGRESS NOTES
Veronica Kenney is a 55year old female.     HPI:     HPI     Patient is in for an annual contact lens exam. She has Armen Proclear toric lenses in monovision and wants to be able to see better far and near with them    Last edited by Emeli Wooten, OD on (Patient not taking: Reported on 3/16/2021 ) 14 tablet 0   • Estradiol 10 MCG Vaginal Tab Place 1 tablet vaginally.  (Patient not taking: Reported on 3/16/2021 )         Allergies:  No Known Allergies    ROS:     ROS     Negative for: Constitutional, Gastro -7.25 -2.00 180 20/20 +2.25          Final Rx       Sphere Cylinder Axis Dist VA Add    Right -6.50 -2.00 180 20/20 +2.25    Left -7.25 -2.00 180 20/20 +2.25    Type: Progressive bifocal            Contact Lens Exam     Current Contact Lens Rx       Brand

## 2021-03-25 ENCOUNTER — TELEPHONE (OUTPATIENT)
Dept: OPTOMETRY | Facility: CLINIC | Age: 56
End: 2021-03-25

## 2021-04-06 ENCOUNTER — OFFICE VISIT (OUTPATIENT)
Dept: FAMILY MEDICINE CLINIC | Facility: CLINIC | Age: 56
End: 2021-04-06
Payer: COMMERCIAL

## 2021-04-06 VITALS
RESPIRATION RATE: 18 BRPM | SYSTOLIC BLOOD PRESSURE: 111 MMHG | DIASTOLIC BLOOD PRESSURE: 78 MMHG | OXYGEN SATURATION: 100 % | HEART RATE: 75 BPM | TEMPERATURE: 98 F

## 2021-04-06 DIAGNOSIS — Z20.822 ENCOUNTER FOR SCREENING LABORATORY TESTING FOR COVID-19 VIRUS IN ASYMPTOMATIC PATIENT: Primary | ICD-10-CM

## 2021-04-06 PROCEDURE — 3074F SYST BP LT 130 MM HG: CPT | Performed by: NURSE PRACTITIONER

## 2021-04-06 PROCEDURE — 3078F DIAST BP <80 MM HG: CPT | Performed by: NURSE PRACTITIONER

## 2021-04-06 PROCEDURE — 99213 OFFICE O/P EST LOW 20 MIN: CPT | Performed by: NURSE PRACTITIONER

## 2021-04-06 NOTE — PATIENT INSTRUCTIONS
• Covid hotline number is [phone]  • Results for covid testing can vary from 1-2 days  • If you have not received results by 2 days please contact the clinic  • Notify your employer or school of testing  • Remember if you tested negative but were expo follow all care and home isolation instructions. Your test results will be called to you from an [employer] representative. If you have not received a call within 2 business days, please call your primary care provider or check MyChart for results. frequently. Your healthcare provider can help direct you on next steps. If you have not been exposed or are not aware of an exposure to COVID-19 and are concerned about your symptoms, please contact your health care provider with any questions.     Home donating plasma is very similar to donating blood. Tyron Godinez (a large blood research institute in [address] [place] [address] [employer] [facility] and one of [facility]’s blood product suppliers) is coordinating plasma donations.     If you would be interested in donating your plasma to hel your provider's instructions. You may be advised to isolate yourself at home. This is called self-isolation. · Don’t panic. Keep in mind that other illnesses can cause similar symptoms. · Stay away from work, school, and public places.  Limit physical con give you instructions. This will help prevent the virus from spreading. · If you need to go to a hospital or clinic, expect that the healthcare staff will wear protective equipment such as masks, gowns, gloves, and eye protection.  You may be put in a sepa caffeine or alcohol. · Taking over-the-counter (OTC) pain medicine. These are used to help ease pain and reduce fever. Follow your healthcare provider's instructions for which OTC medicine to use.   If you've been in the hospital for suspected or confirmed you are normally healthy, you can stop self-isolation when all 3 of these are true:  1. You have had no fever for at least 72 hours. This means no fever without medicine that reduces fever, such as acetaminophen, for at least 72 hours.   2. Your symptoms ar waking  · Fainting or loss of consciousness  · Coughing up blood   Going home from the hospital  If you were diagnosed with COVID-19 and were recently discharged from the hospital:  · Follow the instructions above for self-care and isolation.   · Follow the

## 2021-04-06 NOTE — PROGRESS NOTES
CHIEF COMPLAINT:   Patient presents with:  Covid-19 Test: no symptoms, [place], exposed - 5 days ago      HPI:   Marques Ortega is a 55year old female who presents for Covid 19 exposure 5 days ago.   At this time, not experiencing upper respiratory symp headaches    EXAM:   /78   Pulse 75   Temp 97.7 °F (36.5 °C) (Tympanic)   Resp 18   LMP 11/25/2014 (Exact Date)   SpO2 100%   GENERAL: well developed, well nourished, in no apparent distress  SKIN: no rashes,no suspicious lesions  HEAD: atraumatic, n symptoms, shortness of breath, bluish lips, extreme fatigue, or extreme muscle aches, persistent fevers, you should seek emergency care.    • The [facility] website is a great resource for information: William    [employer] If you must go out, avoid using any kind of public transportation, ridesharing, or taxis. 2. Monitor your symptoms carefully. If your symptoms get worse, call your healthcare provider immediately. 3. Get rest and stay hydrated.    4. If you have a medica the below guidelines:  • At least 24 hours have passed since recovery defined as resolution of fever without the use of fever-reducing medications; and  · Improvement in respiratory symptoms (e.g., cough, shortness of breath); and  · At least 10 days have [employer]    Who is eligible to donate convalescent plasma?     Potential convalescent plasma donors must:    · Have had a confirmed diagnosis of COVID-19  · Be symptom-free for at least 14 days*    *Some peo utensils. Clean surfaces you touch with disinfectant. This is to help prevent the virus from spreading. · If you need to cough or sneeze, do it into a tissue. Then throw the tissue into the trash.  If you don't have tissues, cough or sneeze into the bend o is to protect other people from your germs. If you are not able to wear a mask, your caregivers should. During a public health emergency, medical face masks may be reserved for healthcare workers. You may need to make a cloth face mask of your own.  You can include when it's OK to stop self-isolation. You may also get instructions on position changes to help your breathing, such as lying on your belly (prone positioning).   If you've had confirmed COVID-19, your healthcare team may ask you to consider donating your first symptoms started. Talk with your healthcare provider before you leave home. Tell him or her if the 3 things above are true for you. He or she may tell you it’s OK to leave home. In some cases, your state or local area may have specific advice. unclear to you. Write down answers so you remember them. Date last modified: 5/8/2020  Ehsan last reviewed this educational content on 4/1/2020  © 1116-5185 [employer] [address]. [address] [facility], [address] [place] [address]. All rights reserved.  This

## 2021-04-23 ENCOUNTER — TELEPHONE (OUTPATIENT)
Dept: OPTOMETRY | Facility: CLINIC | Age: 56
End: 2021-04-23

## 2021-04-29 ENCOUNTER — TELEPHONE (OUTPATIENT)
Dept: INTERNAL MEDICINE CLINIC | Facility: CLINIC | Age: 56
End: 2021-04-29

## 2021-04-29 ENCOUNTER — NURSE TRIAGE (OUTPATIENT)
Dept: INTERNAL MEDICINE CLINIC | Facility: CLINIC | Age: 56
End: 2021-04-29

## 2021-04-29 ENCOUNTER — HOSPITAL ENCOUNTER (OUTPATIENT)
Dept: GENERAL RADIOLOGY | Facility: HOSPITAL | Age: 56
Discharge: HOME | End: 2021-04-29
Attending: NURSE PRACTITIONER
Payer: COMMERCIAL

## 2021-04-29 ENCOUNTER — OFFICE VISIT (OUTPATIENT)
Dept: INTERNAL MEDICINE CLINIC | Facility: CLINIC | Age: 56
End: 2021-04-29
Payer: COMMERCIAL

## 2021-04-29 VITALS
BODY MASS INDEX: 19.15 KG/M2 | HEIGHT: 67 IN | HEART RATE: 91 BPM | SYSTOLIC BLOOD PRESSURE: 108 MMHG | WEIGHT: 122 LBS | DIASTOLIC BLOOD PRESSURE: 70 MMHG

## 2021-04-29 DIAGNOSIS — S69.92XA INJURY OF LEFT WRIST, INITIAL ENCOUNTER: ICD-10-CM

## 2021-04-29 DIAGNOSIS — S69.92XA INJURY OF LEFT WRIST, INITIAL ENCOUNTER: Primary | ICD-10-CM

## 2021-04-29 PROCEDURE — 3074F SYST BP LT 130 MM HG: CPT | Performed by: NURSE PRACTITIONER

## 2021-04-29 PROCEDURE — 3008F BODY MASS INDEX DOCD: CPT | Performed by: NURSE PRACTITIONER

## 2021-04-29 PROCEDURE — 99213 OFFICE O/P EST LOW 20 MIN: CPT | Performed by: NURSE PRACTITIONER

## 2021-04-29 PROCEDURE — 3078F DIAST BP <80 MM HG: CPT | Performed by: NURSE PRACTITIONER

## 2021-04-29 PROCEDURE — 73110 X-RAY EXAM OF WRIST: CPT | Performed by: NURSE PRACTITIONER

## 2021-04-29 NOTE — TELEPHONE ENCOUNTER
Patient following up for x-ray of the wrist results, she confirms she has viewed result notes via [email or web address], no other questions.

## 2021-04-29 NOTE — PROGRESS NOTES
HPI:    Patient ID: Faina Fox is a 55year old female. HPI Left Writ Injury  55year old female who was playing tennis this morning and fell back put her arm back. Patient is intense 8/10. Patient took ibuprofen 600 mg and is applying ice.   Lorie Cooper Alcohol use: No        Alcohol/week: 0.0 standard drinks      Drug use: No      Sexual activity: Yes        Partners: Male    Other Topics      Concerns:        Caffeine Concern: Yes          per NG: coffee, 3 cups         Review of Systems   Constitutio time.   Psychiatric:         Mood and Affect: Mood normal.         Behavior: Behavior normal.         Thought Content:  Thought content normal.         Judgment: Judgment normal.       /70 (BP Location: Right arm, Patient Position: Sitting, Cuff Size:

## 2021-04-29 NOTE — ASSESSMENT & PLAN NOTE
A/P 43-year-old female who was playing tennis this morning at [employer] and fell backwards and put her arm down behind her. She has some bruising of her left wrist.  She cannot flex the wrist without pain. She can hyper extend her hand.   Her pain level i

## 2021-07-20 DIAGNOSIS — F32.A MILD DEPRESSIVE DISORDER: ICD-10-CM

## 2021-07-20 NOTE — TELEPHONE ENCOUNTER
Dr. Charlotte Lacey, Do you want to refill this RX ?/  Please advise and thank you.     Requested Prescriptions     Name from pharmacy: FLUOXETINE 10MG CAPSULES         Will file in chart as: FLUOXETINE HCL 10 MG Oral Cap    The original prescription was discontinue

## 2021-07-23 RX ORDER — FLUOXETINE 10 MG/1
CAPSULE ORAL
Qty: 30 CAPSULE | Refills: 3 | Status: SHIPPED | OUTPATIENT
Start: 2021-07-23

## 2021-08-30 RX ORDER — FLUOXETINE HYDROCHLORIDE 20 MG/1
CAPSULE ORAL
Qty: 90 CAPSULE | Refills: 1 | Status: SHIPPED | OUTPATIENT
Start: 2021-08-30 | End: 2021-11-29

## 2021-09-15 ENCOUNTER — HOSPITAL ENCOUNTER (OUTPATIENT)
Dept: BONE DENSITY | Facility: HOSPITAL | Age: 56
Discharge: HOME | End: 2021-09-15
Attending: INTERNAL MEDICINE
Payer: COMMERCIAL

## 2021-09-15 DIAGNOSIS — Z78.0 MENOPAUSE: ICD-10-CM

## 2021-09-15 PROCEDURE — 77080 DXA BONE DENSITY AXIAL: CPT | Performed by: INTERNAL MEDICINE

## 2021-10-05 ENCOUNTER — HOSPITAL ENCOUNTER (OUTPATIENT)
Dept: MAMMOGRAPHY | Facility: HOSPITAL | Age: 56
Discharge: HOME | End: 2021-10-05
Attending: INTERNAL MEDICINE
Payer: COMMERCIAL

## 2021-10-05 DIAGNOSIS — Z12.31 VISIT FOR SCREENING MAMMOGRAM: ICD-10-CM

## 2021-10-05 PROCEDURE — 77067 SCR MAMMO BI INCL CAD: CPT | Performed by: INTERNAL MEDICINE

## 2021-10-05 PROCEDURE — 77063 BREAST TOMOSYNTHESIS BI: CPT | Performed by: INTERNAL MEDICINE

## 2021-11-29 RX ORDER — FLUOXETINE HYDROCHLORIDE 20 MG/1
CAPSULE ORAL
Qty: 90 CAPSULE | Refills: 1 | Status: SHIPPED | OUTPATIENT
Start: 2021-11-29

## 2022-03-24 PROBLEM — N76.1 SUBACUTE VAGINITIS: Status: RESOLVED | Noted: 2020-05-12 | Resolved: 2022-03-24

## 2022-03-24 PROBLEM — B02.39: Status: RESOLVED | Noted: 2019-06-24 | Resolved: 2022-03-24

## 2022-03-24 PROBLEM — R06.09 DOE (DYSPNEA ON EXERTION): Status: RESOLVED | Noted: 2019-08-20 | Resolved: 2022-03-24

## 2022-03-24 PROBLEM — R07.89 CHEST PRESSURE: Status: RESOLVED | Noted: 2019-08-20 | Resolved: 2022-03-24

## 2022-03-24 PROBLEM — Z01.419 ENCOUNTER FOR GYNECOLOGICAL EXAMINATION WITHOUT ABNORMAL FINDING: Status: ACTIVE | Noted: 2022-03-24

## 2022-03-24 PROBLEM — R06.00 DOE (DYSPNEA ON EXERTION): Status: RESOLVED | Noted: 2019-08-20 | Resolved: 2022-03-24

## 2022-03-24 PROBLEM — R30.0 DYSURIA: Status: RESOLVED | Noted: 2020-04-27 | Resolved: 2022-03-24

## 2022-03-31 PROBLEM — U07.1 COVID-19 VIRUS INFECTION: Status: ACTIVE | Noted: 2022-03-31

## 2022-04-26 ENCOUNTER — HOSPITAL ENCOUNTER (EMERGENCY)
Facility: HOSPITAL | Age: 57
Discharge: HOME | End: 2022-04-26
Attending: EMERGENCY MEDICINE
Payer: COMMERCIAL

## 2022-04-26 ENCOUNTER — APPOINTMENT (OUTPATIENT)
Dept: CT IMAGING | Facility: HOSPITAL | Age: 57
End: 2022-04-26
Attending: EMERGENCY MEDICINE
Payer: COMMERCIAL

## 2022-04-26 VITALS
TEMPERATURE: 100 F | SYSTOLIC BLOOD PRESSURE: 104 MMHG | DIASTOLIC BLOOD PRESSURE: 58 MMHG | WEIGHT: 143 LBS | HEART RATE: 83 BPM | OXYGEN SATURATION: 99 % | RESPIRATION RATE: 20 BRPM | HEIGHT: 67 IN | BODY MASS INDEX: 22.44 KG/M2

## 2022-04-26 DIAGNOSIS — E86.0 DEHYDRATION: ICD-10-CM

## 2022-04-26 DIAGNOSIS — R11.2 NAUSEA AND VOMITING IN ADULT: ICD-10-CM

## 2022-04-26 DIAGNOSIS — N30.00 ACUTE CYSTITIS WITHOUT HEMATURIA: Primary | ICD-10-CM

## 2022-04-26 DIAGNOSIS — R51.9 NONINTRACTABLE HEADACHE, UNSPECIFIED CHRONICITY PATTERN, UNSPECIFIED HEADACHE TYPE: ICD-10-CM

## 2022-04-26 LAB
ANION GAP SERPL CALC-SCNC: 7 MMOL/L (ref 0–18)
BASOPHILS # BLD AUTO: 0.02 X10(3) UL (ref 0–0.2)
BASOPHILS NFR BLD AUTO: 0.3 %
BILIRUB UR QL: NEGATIVE
BUN BLD-MCNC: 13 MG/DL (ref 7–18)
BUN/CREAT SERPL: 14.8 (ref 10–20)
CALCIUM BLD-MCNC: 9 MG/DL (ref 8.5–10.1)
CHLORIDE SERPL-SCNC: 102 MMOL/L (ref 98–112)
CO2 SERPL-SCNC: 26 MMOL/L (ref 21–32)
COLOR UR: YELLOW
CREAT BLD-MCNC: 0.88 MG/DL
DEPRECATED RDW RBC AUTO: 43.7 FL (ref 35.1–46.3)
EOSINOPHIL # BLD AUTO: 0.06 X10(3) UL (ref 0–0.7)
EOSINOPHIL NFR BLD AUTO: 0.8 %
ERYTHROCYTE [DISTWIDTH] IN BLOOD BY AUTOMATED COUNT: 13.1 % (ref 11–15)
FLUAV + FLUBV RNA SPEC NAA+PROBE: NEGATIVE
FLUAV + FLUBV RNA SPEC NAA+PROBE: NEGATIVE
GLUCOSE BLD-MCNC: 99 MG/DL (ref 70–99)
GLUCOSE UR-MCNC: NEGATIVE MG/DL
HCT VFR BLD AUTO: 41.9 %
HGB BLD-MCNC: 14.1 G/DL
HGB UR QL STRIP.AUTO: NEGATIVE
IMM GRANULOCYTES # BLD AUTO: 0.02 X10(3) UL (ref 0–1)
IMM GRANULOCYTES NFR BLD: 0.3 %
KETONES UR-MCNC: 80 MG/DL
LYMPHOCYTES # BLD AUTO: 0.41 X10(3) UL (ref 1–4)
LYMPHOCYTES NFR BLD AUTO: 5.6 %
MCH RBC QN AUTO: 30.5 PG (ref 26–34)
MCHC RBC AUTO-ENTMCNC: 33.7 G/DL (ref 31–37)
MCV RBC AUTO: 90.7 FL
MONOCYTES # BLD AUTO: 0.28 X10(3) UL (ref 0.1–1)
MONOCYTES NFR BLD AUTO: 3.8 %
NEUTROPHILS # BLD AUTO: 6.58 X10 (3) UL (ref 1.5–7.7)
NEUTROPHILS # BLD AUTO: 6.58 X10(3) UL (ref 1.5–7.7)
NEUTROPHILS NFR BLD AUTO: 89.2 %
NITRITE UR QL STRIP.AUTO: NEGATIVE
OSMOLALITY SERPL CALC.SUM OF ELEC: 280 MOSM/KG (ref 275–295)
PH UR: 8 [PH] (ref 5–8)
PLATELET # BLD AUTO: 192 10(3)UL (ref 150–450)
POTASSIUM SERPL-SCNC: 3.9 MMOL/L (ref 3.5–5.1)
PROT UR-MCNC: NEGATIVE MG/DL
RBC # BLD AUTO: 4.62 X10(6)UL
RSV RNA SPEC NAA+PROBE: NEGATIVE
SARS-COV-2 RNA RESP QL NAA+PROBE: NOT DETECTED
SODIUM SERPL-SCNC: 135 MMOL/L (ref 136–145)
SP GR UR STRIP: 1.02 (ref 1–1.03)
UROBILINOGEN UR STRIP-ACNC: <2
VIT C UR-MCNC: NEGATIVE MG/DL
WBC # BLD AUTO: 7.4 X10(3) UL (ref 4–11)
WBC #/AREA URNS AUTO: >50 /HPF

## 2022-04-26 PROCEDURE — 96361 HYDRATE IV INFUSION ADD-ON: CPT

## 2022-04-26 PROCEDURE — 99284 EMERGENCY DEPT VISIT MOD MDM: CPT

## 2022-04-26 PROCEDURE — 87086 URINE CULTURE/COLONY COUNT: CPT | Performed by: EMERGENCY MEDICINE

## 2022-04-26 PROCEDURE — 0241U SARS-COV-2/FLU A AND B/RSV BY PCR (GENEXPERT): CPT | Performed by: EMERGENCY MEDICINE

## 2022-04-26 PROCEDURE — 85025 COMPLETE CBC W/AUTO DIFF WBC: CPT | Performed by: EMERGENCY MEDICINE

## 2022-04-26 PROCEDURE — 80048 BASIC METABOLIC PNL TOTAL CA: CPT | Performed by: EMERGENCY MEDICINE

## 2022-04-26 PROCEDURE — 80048 BASIC METABOLIC PNL TOTAL CA: CPT

## 2022-04-26 PROCEDURE — 81001 URINALYSIS AUTO W/SCOPE: CPT | Performed by: EMERGENCY MEDICINE

## 2022-04-26 PROCEDURE — 96375 TX/PRO/DX INJ NEW DRUG ADDON: CPT

## 2022-04-26 PROCEDURE — 85025 COMPLETE CBC W/AUTO DIFF WBC: CPT

## 2022-04-26 PROCEDURE — 96365 THER/PROPH/DIAG IV INF INIT: CPT

## 2022-04-26 PROCEDURE — 70450 CT HEAD/BRAIN W/O DYE: CPT | Performed by: EMERGENCY MEDICINE

## 2022-04-26 RX ORDER — METOCLOPRAMIDE 10 MG/1
10 TABLET ORAL EVERY 6 HOURS PRN
Qty: 20 TABLET | Refills: 0 | Status: SHIPPED | OUTPATIENT
Start: 2022-04-26 | End: 2022-05-01

## 2022-04-26 RX ORDER — METOCLOPRAMIDE HYDROCHLORIDE 5 MG/ML
10 INJECTION INTRAMUSCULAR; INTRAVENOUS ONCE
Status: COMPLETED | OUTPATIENT
Start: 2022-04-26 | End: 2022-04-26

## 2022-04-26 RX ORDER — DIPHENHYDRAMINE HYDROCHLORIDE 50 MG/ML
25 INJECTION INTRAMUSCULAR; INTRAVENOUS ONCE
Status: COMPLETED | OUTPATIENT
Start: 2022-04-26 | End: 2022-04-26

## 2022-04-26 RX ORDER — CEPHALEXIN 500 MG/1
500 CAPSULE ORAL 2 TIMES DAILY
Qty: 14 CAPSULE | Refills: 0 | Status: SHIPPED | OUTPATIENT
Start: 2022-04-26 | End: 2022-05-03

## 2022-04-26 RX ORDER — ACETAMINOPHEN 500 MG
1000 TABLET ORAL ONCE
Status: COMPLETED | OUTPATIENT
Start: 2022-04-26 | End: 2022-04-26

## 2022-05-03 ENCOUNTER — LAB ENCOUNTER (OUTPATIENT)
Dept: LAB | Facility: HOSPITAL | Age: 57
End: 2022-05-03
Attending: INTERNAL MEDICINE
Payer: COMMERCIAL

## 2022-05-03 DIAGNOSIS — E55.9 VITAMIN D DEFICIENCY: ICD-10-CM

## 2022-05-03 DIAGNOSIS — Z00.00 ROUTINE GENERAL MEDICAL EXAMINATION AT A HEALTH CARE FACILITY: Primary | ICD-10-CM

## 2022-05-03 DIAGNOSIS — Z00.00 ROUTINE PHYSICAL EXAMINATION: ICD-10-CM

## 2022-05-03 LAB
ALBUMIN SERPL-MCNC: 3.7 G/DL (ref 3.4–5)
ALBUMIN/GLOB SERPL: 1.3 {RATIO} (ref 1–2)
ALP LIVER SERPL-CCNC: 69 U/L
ALT SERPL-CCNC: 25 U/L
ANION GAP SERPL CALC-SCNC: 6 MMOL/L (ref 0–18)
AST SERPL-CCNC: 22 U/L (ref 15–37)
BASOPHILS # BLD AUTO: 0.06 X10(3) UL (ref 0–0.2)
BASOPHILS NFR BLD AUTO: 1.1 %
BILIRUB SERPL-MCNC: 0.4 MG/DL (ref 0.1–2)
BILIRUB UR QL: NEGATIVE
BUN BLD-MCNC: 7 MG/DL (ref 7–18)
BUN/CREAT SERPL: 7.9 (ref 10–20)
CALCIUM BLD-MCNC: 9 MG/DL (ref 8.5–10.1)
CHLORIDE SERPL-SCNC: 103 MMOL/L (ref 98–112)
CHOLEST SERPL-MCNC: 207 MG/DL (ref ?–200)
CO2 SERPL-SCNC: 27 MMOL/L (ref 21–32)
COLOR UR: YELLOW
CREAT BLD-MCNC: 0.89 MG/DL
DEPRECATED RDW RBC AUTO: 43.4 FL (ref 35.1–46.3)
EOSINOPHIL # BLD AUTO: 0.26 X10(3) UL (ref 0–0.7)
EOSINOPHIL NFR BLD AUTO: 4.7 %
ERYTHROCYTE [DISTWIDTH] IN BLOOD BY AUTOMATED COUNT: 12.8 % (ref 11–15)
FASTING PATIENT LIPID ANSWER: YES
FASTING STATUS PATIENT QL REPORTED: YES
GLOBULIN PLAS-MCNC: 2.9 G/DL (ref 2.8–4.4)
GLUCOSE BLD-MCNC: 97 MG/DL (ref 70–99)
GLUCOSE UR-MCNC: NEGATIVE MG/DL
HCT VFR BLD AUTO: 36.7 %
HDLC SERPL-MCNC: 53 MG/DL (ref 40–59)
HGB BLD-MCNC: 12.4 G/DL
HGB UR QL STRIP.AUTO: NEGATIVE
IMM GRANULOCYTES # BLD AUTO: 0.03 X10(3) UL (ref 0–1)
IMM GRANULOCYTES NFR BLD: 0.5 %
KETONES UR-MCNC: NEGATIVE MG/DL
LDLC SERPL CALC-MCNC: 139 MG/DL (ref ?–100)
LYMPHOCYTES # BLD AUTO: 2.63 X10(3) UL (ref 1–4)
LYMPHOCYTES NFR BLD AUTO: 48 %
MCH RBC QN AUTO: 31.2 PG (ref 26–34)
MCHC RBC AUTO-ENTMCNC: 33.8 G/DL (ref 31–37)
MCV RBC AUTO: 92.2 FL
MONOCYTES # BLD AUTO: 0.34 X10(3) UL (ref 0.1–1)
MONOCYTES NFR BLD AUTO: 6.2 %
NEUTROPHILS # BLD AUTO: 2.16 X10 (3) UL (ref 1.5–7.7)
NEUTROPHILS # BLD AUTO: 2.16 X10(3) UL (ref 1.5–7.7)
NEUTROPHILS NFR BLD AUTO: 39.5 %
NITRITE UR QL STRIP.AUTO: NEGATIVE
NONHDLC SERPL-MCNC: 154 MG/DL (ref ?–130)
OSMOLALITY SERPL CALC.SUM OF ELEC: 280 MOSM/KG (ref 275–295)
PH UR: 6 [PH] (ref 5–8)
PLATELET # BLD AUTO: 244 10(3)UL (ref 150–450)
POTASSIUM SERPL-SCNC: 4.6 MMOL/L (ref 3.5–5.1)
PROT SERPL-MCNC: 6.6 G/DL (ref 6.4–8.2)
PROT UR-MCNC: NEGATIVE MG/DL
RBC # BLD AUTO: 3.98 X10(6)UL
SODIUM SERPL-SCNC: 136 MMOL/L (ref 136–145)
SP GR UR STRIP: 1.01 (ref 1–1.03)
TRIGL SERPL-MCNC: 81 MG/DL (ref 30–149)
TSI SER-ACNC: 2.36 MIU/ML (ref 0.36–3.74)
UROBILINOGEN UR STRIP-ACNC: <2
VIT B12 SERPL-MCNC: 447 PG/ML (ref 193–986)
VIT C UR-MCNC: 40 MG/DL
VIT D+METAB SERPL-MCNC: 34.4 NG/ML (ref 30–100)
VLDLC SERPL CALC-MCNC: 15 MG/DL (ref 0–30)
WBC # BLD AUTO: 5.5 X10(3) UL (ref 4–11)

## 2022-05-03 PROCEDURE — 80061 LIPID PANEL: CPT

## 2022-05-03 PROCEDURE — 36415 COLL VENOUS BLD VENIPUNCTURE: CPT

## 2022-05-03 PROCEDURE — 85025 COMPLETE CBC W/AUTO DIFF WBC: CPT

## 2022-05-03 PROCEDURE — 82306 VITAMIN D 25 HYDROXY: CPT

## 2022-05-03 PROCEDURE — 80053 COMPREHEN METABOLIC PANEL: CPT

## 2022-05-03 PROCEDURE — 82607 VITAMIN B-12: CPT

## 2022-05-03 PROCEDURE — 81015 MICROSCOPIC EXAM OF URINE: CPT

## 2022-05-03 PROCEDURE — 84443 ASSAY THYROID STIM HORMONE: CPT

## 2022-05-03 PROCEDURE — 81001 URINALYSIS AUTO W/SCOPE: CPT

## 2022-06-30 ENCOUNTER — OFFICE VISIT (OUTPATIENT)
Dept: DERMATOLOGY CLINIC | Facility: CLINIC | Age: 57
End: 2022-06-30
Payer: COMMERCIAL

## 2022-06-30 DIAGNOSIS — L98.9 PAINFUL SKIN LESION: ICD-10-CM

## 2022-06-30 DIAGNOSIS — L81.4 LENTIGO: ICD-10-CM

## 2022-06-30 DIAGNOSIS — D23.9 BENIGN NEOPLASM OF SKIN, UNSPECIFIED LOCATION: ICD-10-CM

## 2022-06-30 DIAGNOSIS — L91.8 INFLAMED ACROCHORDON: Primary | ICD-10-CM

## 2022-06-30 DIAGNOSIS — D22.9 MULTIPLE NEVI: ICD-10-CM

## 2022-06-30 DIAGNOSIS — L57.0 AK (ACTINIC KERATOSIS): ICD-10-CM

## 2022-06-30 RX ORDER — ROSUVASTATIN CALCIUM 5 MG/1
TABLET, COATED ORAL
COMMUNITY
Start: 2022-05-31

## 2022-11-09 ENCOUNTER — OFFICE VISIT (OUTPATIENT)
Dept: PHYSICAL MEDICINE AND REHAB | Facility: CLINIC | Age: 57
End: 2022-11-09
Payer: COMMERCIAL

## 2022-11-09 VITALS — BODY MASS INDEX: 21.97 KG/M2 | HEIGHT: 67 IN | OXYGEN SATURATION: 99 % | WEIGHT: 140 LBS | HEART RATE: 87 BPM

## 2022-11-09 DIAGNOSIS — M75.41 IMPINGEMENT SYNDROME OF RIGHT SHOULDER: Primary | ICD-10-CM

## 2022-11-09 PROCEDURE — 3008F BODY MASS INDEX DOCD: CPT | Performed by: PHYSICAL MEDICINE & REHABILITATION

## 2022-11-09 PROCEDURE — 99203 OFFICE O/P NEW LOW 30 MIN: CPT | Performed by: PHYSICAL MEDICINE & REHABILITATION

## 2022-11-09 RX ORDER — MELOXICAM 15 MG/1
15 TABLET ORAL DAILY
Qty: 30 TABLET | Refills: 0 | Status: SHIPPED | OUTPATIENT
Start: 2022-11-09

## 2023-02-15 NOTE — PROGRESS NOTES
Local pharmacy: [facility]  Height/weight: 5'7/128/20 BMI  Allergies: NKA  Provider Preference: no  Med review- med review done with patient on: 8/22/19   Anticoagulants: NO  Herbals/supplements: NO   Diabetic Meds: NO   BP meds(Ace inhibitors/ARB's): NO none

## 2023-03-11 ENCOUNTER — HOSPITAL ENCOUNTER (OUTPATIENT)
Dept: MAMMOGRAPHY | Facility: HOSPITAL | Age: 58
Discharge: HOME | End: 2023-03-11
Attending: INTERNAL MEDICINE
Payer: COMMERCIAL

## 2023-03-11 DIAGNOSIS — Z12.31 ENCOUNTER FOR SCREENING MAMMOGRAM FOR MALIGNANT NEOPLASM OF BREAST: ICD-10-CM

## 2023-03-11 PROCEDURE — 77067 SCR MAMMO BI INCL CAD: CPT | Performed by: INTERNAL MEDICINE

## 2023-03-11 PROCEDURE — 77063 BREAST TOMOSYNTHESIS BI: CPT | Performed by: INTERNAL MEDICINE

## 2023-10-02 ENCOUNTER — HOSPITAL ENCOUNTER (OUTPATIENT)
Age: 58
Discharge: HOME | End: 2023-10-02
Payer: COMMERCIAL

## 2023-10-02 VITALS
HEART RATE: 91 BPM | OXYGEN SATURATION: 98 % | TEMPERATURE: 98 F | DIASTOLIC BLOOD PRESSURE: 74 MMHG | SYSTOLIC BLOOD PRESSURE: 112 MMHG | RESPIRATION RATE: 16 BRPM

## 2023-10-02 DIAGNOSIS — U07.1 COVID: ICD-10-CM

## 2023-10-02 DIAGNOSIS — Z11.52 ENCOUNTER FOR SCREENING LABORATORY TESTING FOR COVID-19 VIRUS: Primary | ICD-10-CM

## 2023-10-02 LAB — SARS-COV-2 RNA RESP QL NAA+PROBE: DETECTED

## 2023-10-02 PROCEDURE — 99213 OFFICE O/P EST LOW 20 MIN: CPT | Performed by: NURSE PRACTITIONER

## 2023-10-02 PROCEDURE — U0002 COVID-19 LAB TEST NON-CDC: HCPCS | Performed by: NURSE PRACTITIONER

## 2023-10-10 ENCOUNTER — HOSPITAL ENCOUNTER (OUTPATIENT)
Age: 58
Discharge: HOME | End: 2023-10-10
Payer: COMMERCIAL

## 2023-10-10 VITALS
HEART RATE: 86 BPM | TEMPERATURE: 98 F | SYSTOLIC BLOOD PRESSURE: 109 MMHG | DIASTOLIC BLOOD PRESSURE: 80 MMHG | RESPIRATION RATE: 18 BRPM | OXYGEN SATURATION: 100 %

## 2023-10-10 DIAGNOSIS — U07.1 COVID: Primary | ICD-10-CM

## 2023-10-10 PROCEDURE — 99213 OFFICE O/P EST LOW 20 MIN: CPT | Performed by: NURSE PRACTITIONER

## 2023-10-10 NOTE — ED INITIAL ASSESSMENT (HPI)
Pt c/o headache, sore throat, cough x3 days. States tested positive for covid 10/2/23, took 2 doses of paxlovid. States felt better then symptoms returned 2 days ago.

## 2024-01-29 ENCOUNTER — OFFICE VISIT (OUTPATIENT)
Dept: OBGYN CLINIC | Facility: CLINIC | Age: 59
End: 2024-01-29

## 2024-01-29 VITALS
SYSTOLIC BLOOD PRESSURE: 106 MMHG | DIASTOLIC BLOOD PRESSURE: 72 MMHG | HEART RATE: 76 BPM | WEIGHT: 135 LBS | BODY MASS INDEX: 21 KG/M2

## 2024-01-29 DIAGNOSIS — N95.0 POSTMENOPAUSAL BLEEDING: Primary | ICD-10-CM

## 2024-01-29 PROCEDURE — 99203 OFFICE O/P NEW LOW 30 MIN: CPT | Performed by: OBSTETRICS & GYNECOLOGY

## 2024-01-29 PROCEDURE — 3074F SYST BP LT 130 MM HG: CPT | Performed by: OBSTETRICS & GYNECOLOGY

## 2024-01-29 PROCEDURE — 3078F DIAST BP <80 MM HG: CPT | Performed by: OBSTETRICS & GYNECOLOGY

## 2024-01-30 LAB
BV BACTERIA DNA VAG QL NAA+PROBE: NEGATIVE
C GLABRATA DNA VAG QL NAA+PROBE: NEGATIVE
C KRUSEI DNA VAG QL NAA+PROBE: NEGATIVE
CANDIDA DNA VAG QL NAA+PROBE: NEGATIVE
T VAGINALIS DNA VAG QL NAA+PROBE: NEGATIVE

## 2024-02-02 PROBLEM — Z01.419 ENCOUNTER FOR GYNECOLOGICAL EXAMINATION WITHOUT ABNORMAL FINDING: Status: RESOLVED | Noted: 2022-03-24 | Resolved: 2024-02-02

## 2024-02-02 PROBLEM — Z00.00 ROUTINE PHYSICAL EXAMINATION: Status: RESOLVED | Noted: 2018-09-21 | Resolved: 2024-02-02

## 2024-02-02 PROBLEM — S69.92XA LEFT WRIST INJURY: Status: RESOLVED | Noted: 2021-04-29 | Resolved: 2024-02-02

## 2024-02-02 NOTE — PROGRESS NOTES
Oralia Lombardo is a 59 year old female  Patient's last menstrual period was 2014 (exact date).   Chief Complaint   Patient presents with    Gyn Problem     VAGINAL DISCHARGE / ODOR -- brownish on pad intermittently (+) odor. Denies urine leakage. Last annual in  -- has been coming in only for problems.    . Had to get pt to tell exact color of discharge ?tan vs brown    OBSTETRICS HISTORY:  OB History    Para Term  AB Living   3 2 2 0 1 2   SAB IAB Ectopic Multiple Live Births   0 0 0 0 2       GYNE HISTORY:  Periods none due to menopause    History   Sexual Activity    Sexual activity: Yes    Partners: Male       MEDICAL HISTORY:  Past Medical History:   Diagnosis Date    Episcleritis     per NG: OS    Herpes zoster type dermatitis of eyelid- left 2019    History of pregnancy ,     per NG:  X2     Past Surgical History:   Procedure Laterality Date    COLONOSCOPY N/A 2019    Procedure: COLONOSCOPY;  Surgeon: Dewayne De Los Santos MD;  Location: [facility] ENDOSCOPY    [facility]      [facility] LOCALIZATION WIRE 1 SITE LEFT (CPT=19281)      2017    NEEDLE BIOPSY LEFT        ,      OB History    Para Term  AB Living   3 2 2 0 1 2   SAB IAB Ectopic Multiple Live Births   0 0 0 0 2        SOCIAL HISTORY:  Social History     Socioeconomic History    Marital status:      Spouse name: Not on file    Number of children: Not on file    Years of education: Not on file    Highest education level: Not on file   Occupational History    Not on file   Tobacco Use    Smoking status: Former     Packs/day: 0.25     Years: 35.00     Additional pack years: 0.00     Total pack years: 8.75     Types: Cigarettes     Quit date: 2019     Years since quittin.7    Smokeless tobacco: Never    Tobacco comments:     was a 1ppd smoker,reduced to 1/4 ppd x 10 yrs    Vaping Use    Vaping Use: Never used   Substance and Sexual Activity    Alcohol use: No      Alcohol/week: 0.0 standard drinks of alcohol    Drug use: No    Sexual activity: Yes     Partners: Male   Other Topics Concern     Service Not Asked    Blood Transfusions Not Asked    Caffeine Concern Yes     Comment: per NG: coffee, 3 cups    Occupational Exposure Not Asked    Hobby Hazards Not Asked    Sleep Concern Not Asked    Stress Concern Not Asked    Weight Concern Not Asked    Special Diet Not Asked    Back Care Not Asked    Exercise Not Asked    Bike Helmet Not Asked    Seat Belt Not Asked    Self-Exams Not Asked    Grew up on a farm Not Asked    History of tanning Not Asked    Outdoor occupation Not Asked    Breast feeding Not Asked    Reaction to local anesthetic No   Social History Narrative    Not on file     Social Determinants of Health     Financial Resource Strain: Not on file   Food Insecurity: Not on file   Transportation Needs: Not on file   Physical Activity: Not on file   Stress: Not on file   Social Connections: Not on file   Housing Stability: Not on file       MEDICATIONS:    Current Outpatient Medications:     Meloxicam 15 MG Oral Tab, Take 1 tablet (15 mg total) by mouth daily. (Patient not taking: Reported on 10/2/2023), Disp: 30 tablet, Rfl: 0    rosuvastatin 5 MG Oral Tab, , Disp: , Rfl:     azithromycin (ZITHROMAX Z-GEMMA) 250 MG Oral Tab, Take two tablets today, then one tablet daily for 4 more days (Patient not taking: Reported on 6/30/2022), Disp: 6 tablet, Rfl: 0    Calcium-Magnesium 100-50 MG Oral Tab, Take by mouth. (Patient not taking: Reported on 10/10/2023), Disp: , Rfl:     Cholecalciferol (VITAMIN D-3 OR), Take by mouth. (Patient not taking: Reported on 10/10/2023), Disp: , Rfl:     FLUOXETINE 20 MG Oral Cap, TAKE 1 CAPSULE(20 MG) BY MOUTH DAILY, Disp: 90 capsule, Rfl: 1    ALLERGIES:  No Known Allergies      Review of Systems:  Constitutional:    denies fatigue, night sweats, hot flashes  Cardiovascular:   denies chest pain or palpitations  Respiratory:    denies  shortness of breath  Gastrointestinal:   denies heartburn, abdominal pain, diarrhea or constipation  Genitourinary:    denies dysuria, incontinence, abnormal vaginal discharge, vaginal itching  Musculoskeletal:   denies back pain.  Skin/Breast:    denies any breast pain, lumps, or discharge.   Neurological:    denies headaches, extremity weakness or numbness.  Psychiatric:   denies depression or anxiety.  Endocrine:     denies excessive thirst or urination.  Heme/Lymph:    denies history of anemia, easy bruising or bleeding.      PHYSICAL EXAM:   /72   Pulse 76   Wt 135 lb (61.2 kg)   LMP 11/25/2014 (Exact Date)   BMI 21.14 kg/m²   Constitutional:   well developed, well nourished  Head/Face:  normocephalic  Abdomen:    soft, nontender, nondistended, no masses  Skin/Hair:   no unusual rashes or bruises  Extremities:   no edema, no cyanosis  Psychiatric:    oriented to time, place, person and situation. Appropriate mood and affect    Pelvic Exam:  External Genitalia:  normal appearance, hair distribution, and no lesions  Urethral Meatus:   normal in size, location, without lesions and prolapse  Bladder:    no fullness, masses or tenderness  Vagina:    normal appearance without lesions, no abnormal discharge   No blood seen  Cervix:    normal without tenderness on motion  Uterus:   normal in size, contour, position, mobility, without tenderness  Adnexa:   normal without masses or tenderness  Perineum:   normal  Anus:    no hemorroids   Lymph node:   no inguinal lymph nodes    Assessment & Plan:    Oralia was seen today for gyn problem.    Diagnoses and all orders for this visit:    Postmenopausal bleeding  -     US PELVIS W EV (CPT=76856/05886); Future  -     Vaginitis Vaginosis PCR Panel; Future  -     Vaginitis Vaginosis PCR Panel        Requested Prescriptions      No prescriptions requested or ordered in this encounter       Check pelvic ultrasound to see if lining thick -- if it is abnormally thickened,  plan for endosee, possible embx.  Recommend annual gyne exams separate from PCP exams      Spent total time 20 minutes on obtaining history / chart review, evaluating patient / performing medically appropriate exam, discussing treatment options, counseling / educating, and completing documentation, coordinating care.

## 2024-02-08 ENCOUNTER — HOSPITAL ENCOUNTER (OUTPATIENT)
Dept: ULTRASOUND IMAGING | Age: 59
Discharge: HOME | End: 2024-02-08
Attending: OBSTETRICS & GYNECOLOGY
Payer: COMMERCIAL

## 2024-02-08 DIAGNOSIS — N95.0 POSTMENOPAUSAL BLEEDING: ICD-10-CM

## 2024-02-08 PROCEDURE — 76856 US EXAM PELVIC COMPLETE: CPT | Performed by: OBSTETRICS & GYNECOLOGY

## 2024-02-08 PROCEDURE — 76830 TRANSVAGINAL US NON-OB: CPT | Performed by: OBSTETRICS & GYNECOLOGY

## 2024-02-16 ENCOUNTER — OFFICE VISIT (OUTPATIENT)
Dept: OTOLARYNGOLOGY | Facility: CLINIC | Age: 59
End: 2024-02-16

## 2024-02-16 VITALS — BODY MASS INDEX: 21.69 KG/M2 | HEIGHT: 66 IN | WEIGHT: 135 LBS

## 2024-02-16 DIAGNOSIS — K21.00 GASTROESOPHAGEAL REFLUX DISEASE WITH ESOPHAGITIS, UNSPECIFIED WHETHER HEMORRHAGE: Primary | ICD-10-CM

## 2024-02-16 DIAGNOSIS — H92.03 OTALGIA OF BOTH EARS: ICD-10-CM

## 2024-02-16 RX ORDER — ONDANSETRON 4 MG/1
TABLET, ORALLY DISINTEGRATING ORAL
COMMUNITY
Start: 2023-10-03

## 2024-02-16 RX ORDER — OMEPRAZOLE 40 MG/1
40 CAPSULE, DELAYED RELEASE ORAL DAILY
COMMUNITY
Start: 2023-04-18 | End: 2024-02-16

## 2024-02-16 RX ORDER — FLUTICASONE PROPIONATE 50 MCG
2 SPRAY, SUSPENSION (ML) NASAL DAILY
COMMUNITY
Start: 2023-08-22 | End: 2024-08-16

## 2024-02-16 RX ORDER — OMEPRAZOLE 40 MG/1
40 CAPSULE, DELAYED RELEASE ORAL
Qty: 60 CAPSULE | Refills: 0 | Status: SHIPPED | OUTPATIENT
Start: 2024-02-16 | End: 2024-03-17

## 2024-02-16 NOTE — PROGRESS NOTES
Oralia Lombardo is a 59 year old female.   Chief Complaint   Patient presents with    New Patient    Throat Problem     Patient reports issues with throat when she swallows and talks. States glands hurt.    Ear Problem     Reports ear issues and headaches.       ASSESSMENT AND PLAN:   1. Gastroesophageal reflux disease with esophagitis, unspecified whether hemorrhage  59-year-old presents with several complaints.  She states that she feels a choking or coughing sensation when she eats.  She eats salad for dinner with a lot of vinegar.  She also notes pain in both sides of her back under the angle of her mandible on each side.  She denies grinding or clenching her teeth.    Exam she had slight crepitus of her temporomandibular joints.  On flexible laryngoscopy she had erythema and edema of her arytenoids and interarytenoid area and postcricoid region.    History and flexible laryngoscopy findings consistent with reflux.  She does eat a lot of vinegar with her salads discussed may be laying office a little bit or changing the salad dressing.  Also begin her on omeprazole 40 mg 30 minutes prior to dinner and breakfast or lunch.  Needs to be consistent with this for several weeks to begin to see effects.  Possibly her otologic symptoms are referred from a temporomandibular joint issue given the crepitus on exam.  She can try warm compress or ibuprofen for this as well.  Can follow-up if still not improving.    2. Otalgia of both ears        The patient indicates understanding of these issues and agrees to the plan.      EXAM:   Ht 5' 6\" (1.676 m)   Wt 135 lb (61.2 kg)   LMP 11/25/2014 (Exact Date)   BMI 21.79 kg/m²     Pertinent exam findings may also be noted above in assessment and plan     System Details   Skin Inspection - Normal.   Constitutional Overall appearance - Normal.   Head/Face Symmetric, TMJ tenderness not present    Eyes EOMI, PERRL   Right ear:  Canal clear, TM intact, no PACO   Left ear:  Canal  clear, TM intact, no PACO   Nose: Septum midline, inferior turbinates not enlarged, nasal valves without collapse    Oral cavity/Oropharynx: No lesions or masses on inspection or palpation, tonsils symmetric    Neck: Soft without LAD, thyroid not enlarged  Voice clear/ no stridor   Other:      Scopes and Procedures:       Flexible Laryngoscopy Procedure Note (75448)    Due to inability for adequate examination of the larynx and need for magnification to perform the examination, endoscopy was performed.  Risks and benefits were discussed with patient/family and they have given verbal consent to proceed.    Pre-operative Diagnosis:   1. Gastroesophageal reflux disease with esophagitis, unspecified whether hemorrhage    2. Otalgia of both ears        Post-operative Diagnosis: Same    Procedure: Diagnostic flexible fiberoptic laryngoscopy    Anesthesia: Topical anesthetic [place]     Surgeon Haider Foster MD    EBL: 0cc    Procedure Detail & Findings:     After placement of topical anesthetic intranasally the flexible laryngoscope was inserted into the nare and driven through the nasal cavity with no significant abnormal findings noted in the nasal cavities or nasopharynx. The oropharynx, hypopharynx and larynx were subsequently examined and the following findings were noted:        Base of Tongue: Normal        Valeculla: Normal        Arytenoids: Normal/Erythemous: Erythmous  On flexible laryngoscopy she had erythema and edema of her arytenoids and interarytenoid area and postcricoid region.        Introitus of the esophagus: Normal        Epiglottis: Normal        False vocal cords: Normal        True vocal cords: Normal        Subglottic space: Normal        Mobility of True vocal cords: Normal    Condition: Stable    Complications: Patient tolerated the procedure well with no immediate complication.    Haider Foster MD        Current Outpatient Medications   Medication Sig Dispense Refill    fluticasone propionate 50  MCG/ACT Nasal Suspension 2 sprays by Nasal route daily.      Omeprazole 40 MG Oral Capsule Delayed Release Take 1 capsule (40 mg total) by mouth 2 (two) times daily before meals. Before a meal 60 capsule 0    rosuvastatin 5 MG Oral Tab       FLUOXETINE 20 MG Oral Cap TAKE 1 CAPSULE(20 MG) BY MOUTH DAILY 90 capsule 1    ondansetron 4 MG Oral Tablet Dispersible  (Patient not taking: Reported on 2024)      Meloxicam 15 MG Oral Tab Take 1 tablet (15 mg total) by mouth daily. (Patient not taking: Reported on 10/2/2023) 30 tablet 0    azithromycin (ZITHROMAX Z-GEMMA) 250 MG Oral Tab Take two tablets today, then one tablet daily for 4 more days (Patient not taking: Reported on 2022) 6 tablet 0    Calcium-Magnesium 100-50 MG Oral Tab Take by mouth. (Patient not taking: Reported on 10/10/2023)      Cholecalciferol (VITAMIN D-3 OR) Take by mouth. (Patient not taking: Reported on 10/10/2023)        Past Medical History:   Diagnosis Date    Episcleritis     per NG: OS    Herpes zoster type dermatitis of eyelid- left 2019    History of pregnancy ,     per NG:  X2      Social History:  Social History     Socioeconomic History    Marital status:    Tobacco Use    Smoking status: Former     Packs/day: 0.25     Years: 35.00     Additional pack years: 0.00     Total pack years: 8.75     Types: Cigarettes     Quit date: 2019     Years since quittin.8    Smokeless tobacco: Never    Tobacco comments:     was a 1ppd smoker,reduced to 1/4 ppd x 10 yrs    Vaping Use    Vaping Use: Never used   Substance and Sexual Activity    Alcohol use: No     Alcohol/week: 0.0 standard drinks of alcohol    Drug use: No    Sexual activity: Yes     Partners: Male   Other Topics Concern    Caffeine Concern Yes     Comment: per NG: coffee, 3 cups    Reaction to local anesthetic No          Haider Foster MD  2024  8:06 AM

## 2024-03-11 ENCOUNTER — HOSPITAL ENCOUNTER (EMERGENCY)
Facility: HOSPITAL | Age: 59
Discharge: HOME | End: 2024-03-11
Attending: EMERGENCY MEDICINE
Payer: COMMERCIAL

## 2024-03-11 ENCOUNTER — APPOINTMENT (OUTPATIENT)
Dept: GENERAL RADIOLOGY | Facility: HOSPITAL | Age: 59
End: 2024-03-11
Payer: COMMERCIAL

## 2024-03-11 VITALS
HEART RATE: 59 BPM | BODY MASS INDEX: 21.19 KG/M2 | RESPIRATION RATE: 16 BRPM | OXYGEN SATURATION: 99 % | DIASTOLIC BLOOD PRESSURE: 78 MMHG | WEIGHT: 135 LBS | SYSTOLIC BLOOD PRESSURE: 117 MMHG | HEIGHT: 67 IN | TEMPERATURE: 98 F

## 2024-03-11 DIAGNOSIS — R07.9 CHEST PAIN OF UNCERTAIN ETIOLOGY: Primary | ICD-10-CM

## 2024-03-11 LAB
ALBUMIN SERPL-MCNC: 4.7 G/DL (ref 3.2–4.8)
ALBUMIN/GLOB SERPL: 1.9 {RATIO} (ref 1–2)
ALP LIVER SERPL-CCNC: 66 U/L
ALT SERPL-CCNC: 11 U/L
ANION GAP SERPL CALC-SCNC: 5 MMOL/L (ref 0–18)
AST SERPL-CCNC: 22 U/L (ref ?–34)
BASOPHILS # BLD AUTO: 0.05 X10(3) UL (ref 0–0.2)
BASOPHILS NFR BLD AUTO: 0.7 %
BILIRUB SERPL-MCNC: 0.8 MG/DL (ref 0.3–1.2)
BUN BLD-MCNC: 8 MG/DL (ref 9–23)
BUN/CREAT SERPL: 10 (ref 10–20)
CALCIUM BLD-MCNC: 9.7 MG/DL (ref 8.7–10.4)
CHLORIDE SERPL-SCNC: 101 MMOL/L (ref 98–112)
CO2 SERPL-SCNC: 27 MMOL/L (ref 21–32)
CREAT BLD-MCNC: 0.8 MG/DL
DEPRECATED RDW RBC AUTO: 41.3 FL (ref 35.1–46.3)
EGFRCR SERPLBLD CKD-EPI 2021: 85 ML/MIN/1.73M2 (ref 60–?)
EOSINOPHIL # BLD AUTO: 0.13 X10(3) UL (ref 0–0.7)
EOSINOPHIL NFR BLD AUTO: 1.8 %
ERYTHROCYTE [DISTWIDTH] IN BLOOD BY AUTOMATED COUNT: 12.4 % (ref 11–15)
GLOBULIN PLAS-MCNC: 2.5 G/DL (ref 2.8–4.4)
GLUCOSE BLD-MCNC: 83 MG/DL (ref 70–99)
HCT VFR BLD AUTO: 36.4 %
HGB BLD-MCNC: 12.2 G/DL
IMM GRANULOCYTES # BLD AUTO: 0.02 X10(3) UL (ref 0–1)
IMM GRANULOCYTES NFR BLD: 0.3 %
LYMPHOCYTES # BLD AUTO: 3.08 X10(3) UL (ref 1–4)
LYMPHOCYTES NFR BLD AUTO: 42.5 %
MCH RBC QN AUTO: 30.2 PG (ref 26–34)
MCHC RBC AUTO-ENTMCNC: 33.5 G/DL (ref 31–37)
MCV RBC AUTO: 90.1 FL
MONOCYTES # BLD AUTO: 0.33 X10(3) UL (ref 0.1–1)
MONOCYTES NFR BLD AUTO: 4.6 %
NEUTROPHILS # BLD AUTO: 3.64 X10 (3) UL (ref 1.5–7.7)
NEUTROPHILS # BLD AUTO: 3.64 X10(3) UL (ref 1.5–7.7)
NEUTROPHILS NFR BLD AUTO: 50.1 %
OSMOLALITY SERPL CALC.SUM OF ELEC: 273 MOSM/KG (ref 275–295)
PLATELET # BLD AUTO: 237 10(3)UL (ref 150–450)
POTASSIUM SERPL-SCNC: 3.8 MMOL/L (ref 3.5–5.1)
PROT SERPL-MCNC: 7.2 G/DL (ref 5.7–8.2)
RBC # BLD AUTO: 4.04 X10(6)UL
SODIUM SERPL-SCNC: 133 MMOL/L (ref 136–145)
TROPONIN I SERPL HS-MCNC: <3 NG/L
WBC # BLD AUTO: 7.3 X10(3) UL (ref 4–11)

## 2024-03-11 PROCEDURE — 85025 COMPLETE CBC W/AUTO DIFF WBC: CPT | Performed by: EMERGENCY MEDICINE

## 2024-03-11 PROCEDURE — 93010 ELECTROCARDIOGRAM REPORT: CPT

## 2024-03-11 PROCEDURE — 99285 EMERGENCY DEPT VISIT HI MDM: CPT

## 2024-03-11 PROCEDURE — 71045 X-RAY EXAM CHEST 1 VIEW: CPT

## 2024-03-11 PROCEDURE — 80053 COMPREHEN METABOLIC PANEL: CPT | Performed by: EMERGENCY MEDICINE

## 2024-03-11 PROCEDURE — 84484 ASSAY OF TROPONIN QUANT: CPT | Performed by: EMERGENCY MEDICINE

## 2024-03-11 PROCEDURE — 85025 COMPLETE CBC W/AUTO DIFF WBC: CPT

## 2024-03-11 PROCEDURE — 93005 ELECTROCARDIOGRAM TRACING: CPT

## 2024-03-11 PROCEDURE — 36415 COLL VENOUS BLD VENIPUNCTURE: CPT

## 2024-03-11 PROCEDURE — 99284 EMERGENCY DEPT VISIT MOD MDM: CPT

## 2024-03-11 NOTE — DISCHARGE INSTRUCTIONS
Follow-up with your primary care doctor or cardiology for further cardiac testing such as a nuclear stress test or other preventative screening testing.

## 2024-03-12 LAB
ATRIAL RATE: 67 BPM
P AXIS: 75 DEGREES
P-R INTERVAL: 148 MS
Q-T INTERVAL: 404 MS
QRS DURATION: 86 MS
QTC CALCULATION (BEZET): 426 MS
R AXIS: 34 DEGREES
T AXIS: 44 DEGREES
VENTRICULAR RATE: 67 BPM

## 2024-03-12 NOTE — ED PROVIDER NOTES
Patient Seen in: Health system Emergency Department    History     Chief Complaint   Patient presents with    Chest Pain       HPI    The patient presents to the ED complaining of pressure discomfort to the center of her chest for the past 2 weeks.  She states that the symptoms come and go but today they were more frequent.  She states that discomfort is not exacerbated necessarily with exertion or deep breaths.  Has had some associated shortness of breath but none currently.  Denies any cardiac history but does have family history of cardiac disease.  She has no other risk factors and family history.    History reviewed.   Past Medical History:   Diagnosis Date    Episcleritis     per NG: OS    Herpes zoster type dermatitis of eyelid- left 2019    History of pregnancy ,     per NG:  X2       History reviewed.   Past Surgical History:   Procedure Laterality Date    COLONOSCOPY N/A 2019    Procedure: COLONOSCOPY;  Surgeon: Dewayne De Los Santos MD;  Location: [facility] ENDOSCOPY    [facility]  1988    [facility] LOCALIZATION WIRE 1 SITE LEFT (CPT=19281)      2017    NEEDLE BIOPSY LEFT        ,          Medications :  (Not in a hospital admission)       Family History   Problem Relation Age of Onset    Heart Disorder Mother     Heart Disease Mother         per NG: cad    Diabetes Mother     Heart Disorder Father     Heart Disease Maternal Grandfather 40        per NG: CAD    Breast Cancer Maternal Cousin Female         30s    Breast Cancer Paternal Grandmother 80    Glaucoma Neg     Macular degeneration Neg        Smoking Status:   Social History     Socioeconomic History    Marital status:    Tobacco Use    Smoking status: Former     Packs/day: 0.25     Years: 35.00     Additional pack years: 0.00     Total pack years: 8.75     Types: Cigarettes     Quit date: 2019     Years since quittin.8    Smokeless tobacco: Never    Tobacco comments:     was a 1ppd smoker,reduced  to 1/4 ppd x 10 yrs    Vaping Use    Vaping Use: Never used   Substance and Sexual Activity    Alcohol use: No     Alcohol/week: 0.0 standard drinks of alcohol    Drug use: No    Sexual activity: Yes     Partners: Male   Other Topics Concern    Caffeine Concern Yes     Comment: per NG: coffee, 3 cups    Reaction to local anesthetic No       Constitutional and vital signs reviewed.      Social History and Family History elements reviewed from today, pertinent positives to the presenting problem noted.    Physical Exam     ED Triage Vitals [03/11/24 1410]   /84   Pulse 73   Resp 16   Temp 97.6 °F (36.4 °C)   Temp src Oral   SpO2 98 %   O2 Device None (Room air)       All measures to prevent infection transmission during my interaction with the patient were taken. Handwashing was performed prior to and after the exam.  Stethoscope and any equipment used during my examination was cleaned with super sani-cloth germicidal wipes following the exam.     Physical Exam  Vitals and nursing note reviewed.   Constitutional:       General: She is not in acute distress.     Appearance: She is well-developed. She is not ill-appearing or toxic-appearing.   HENT:      Head: Normocephalic and atraumatic.   Eyes:      General:         Right eye: No discharge.         Left eye: No discharge.      Conjunctiva/sclera: Conjunctivae normal.   Neck:      Trachea: No tracheal deviation.   Cardiovascular:      Rate and Rhythm: Normal rate and regular rhythm.      Heart sounds: Normal heart sounds.   Pulmonary:      Effort: Pulmonary effort is normal. No respiratory distress.      Breath sounds: No stridor.   Abdominal:      General: There is no distension.      Palpations: Abdomen is soft.   Musculoskeletal:         General: No deformity.   Skin:     General: Skin is warm and dry.   Neurological:      Mental Status: She is alert and oriented to person, place, and time.   Psychiatric:         Mood and Affect: Mood normal.          Behavior: Behavior normal.         ED Course        Labs Reviewed   COMP METABOLIC PANEL (14) - Abnormal; Notable for the following components:       Result Value    Sodium 133 (*)     BUN 8 (*)     Calculated Osmolality 273 (*)     Globulin  2.5 (*)     All other components within normal limits   TROPONIN I HIGH SENSITIVITY - Normal   CBC WITH DIFFERENTIAL WITH PLATELET    Narrative:     The following orders were created for panel order CBC With Differential With Platelet.                  Procedure                               Abnormality         Status                                     ---------                               -----------         ------                                     CBC W/ DIFFERENTIAL[[id number]]                              Final result                                                 Please view results for these tests on the individual orders.   RAINBOW DRAW BLUE   CBC W/ DIFFERENTIAL     EKG    Rate, intervals and axes as noted on EKG Report.  Rate: Normal, 67 bpm  Rhythm: Sinus Rhythm  Reading: Normal intervals, normal ST segments, normal EKG           As Interpreted by me    Imaging Results Available and Reviewed while in ED: XR CHEST AP PORTABLE  (CPT=71045)    Result Date: 3/11/2024  CONCLUSION: No acute cardiopulmonary abnormality.    Dictated by (CST): Abhijit Hernandez MD on 3/11/2024 at 3:51 PM     Finalized by (CST): Abhijit Hernandez MD on 3/11/2024 at 3:52 PM         ED Medications Administered: Medications - No data to display      MDM     Vitals:    03/11/24 1410 03/11/24 1730   BP: 122/84 117/78   Pulse: 73 59   Resp: 16 16   Temp: 97.6 °F (36.4 °C)    TempSrc: Oral    SpO2: 98% 99%   Weight: 61.2 kg    Height: 170.2 cm (5' 7\")      *I personally reviewed and interpreted all ED vitals.    Pulse Ox: 99%, Room air, Normal     Monitor Interpretation:   normal sinus rhythm as interpreted by me.  The cardiac monitor was ordered to monitor heart rate.    Differential Diagnosis/ Diagnostic  Considerations: ACS, pneumonia, pneumothorax, nonspecific chest pain, GERD, gastritis, other    Complicating Factors: The patient already has does not have any pertinent problems on file. to contribute to the complexity of this ED evaluation.    Medical Decision Making  The patient presents to the ED with chest pressure discomfort for the past 2 weeks.  Well-appearing in the ED, normal vital signs and normal cardiac workup.  Low suspicion for cardiac cause given negative workup in the ED and minimal risk factors.  Patient comfortable with discharge home will follow-up with her doctor for further testing as recommended.    HEART score for chest pain  History- (Highly suspicious 2pt, Mod 1pt, slightly 0pt)         1  ECG- (significant ST deviation 2pt, Non spec 1pt, nl 0pt)   0  AGE- (>65 2pt, 45-64 1 pt, Under 45 0 pt)     1  Risk Factors- ( DM,HTN,Chol, fhx CAD, BMI>30, hx CAD)  ( >3 or hx CAD 2pt, 1-2 risks 1pt, None 0pt)   1  Troponin- ( 3 times nl 2pt, 2 times nl 1pt, nl 0pt    0         TOTAL   3    SCORE 0-3: 2.5% MACE over next 6 weeks, D/C with consideration and after informed discussion, outpatient workup.  SCORE 4-6: 20.3% MACE over next 6 weeks consider admit.  SCORE 7-10:72.7% MACE over next 6 weeks consider early invasive strategy.    Prince AJ, Magdalena BE, Elma RAGHU. Chest pain in the emergency room: value of the HEART score. Net Heart J. 2008 Dashawn;16(6):191-6. PubMed PMID: 15556435; PubMed Central PMCID: [id number].    Aortic Dissection Risk Assesment:  High risk Conditions (Marfan, Fhx,Known aortic valve disease, known dissection or recent aortic manipulation) no  High Risk Pain Features (Severe. Abrupt Ripping or tearing) no  High Risk Exam Features (pulse deficit, BP difference, focal neuro deficit,murmur of aortic insufficiency, hypotension or shock) no        Problems Addressed:  Chest pain of uncertain etiology: acute illness or injury that poses a threat to life or bodily functions    Amount and/or  Complexity of Data Reviewed  Labs: ordered. Decision-making details documented in ED Course.  Radiology: ordered and independent interpretation performed. Decision-making details documented in ED Course.     Details: I personally reviewed the patient's chest x-ray image and noted no pneumothorax or pneumonia  ECG/medicine tests: ordered and independent interpretation performed. Decision-making details documented in ED Course.        Condition upon leaving the department: Stable    Disposition and Plan     Clinical Impression:  1. Chest pain of uncertain etiology        Disposition:  Discharge    Follow-up:  Charan Lopez MD  [address] [address]  [phone]    Schedule an appointment as soon as possible for a visit in 1 week(s)        Medications Prescribed:  Discharge Medication List as of 3/11/2024  5:30 PM

## 2024-04-05 ENCOUNTER — PATIENT MESSAGE (OUTPATIENT)
Dept: OBGYN CLINIC | Facility: CLINIC | Age: 59
End: 2024-04-05

## 2024-04-05 DIAGNOSIS — N95.0 POSTMENOPAUSAL BLEEDING: Primary | ICD-10-CM

## 2024-04-05 RX ORDER — MISOPROSTOL 200 UG/1
400 TABLET ORAL
Qty: 2 TABLET | Refills: 0 | Status: SHIPPED | OUTPATIENT
Start: 2024-04-05 | End: 2024-04-05

## 2024-04-05 NOTE — TELEPHONE ENCOUNTER
From: Oralia Lombardo  To: Beulah Brown  Sent: 4/5/2024 7:45 AM CDT  Subject: Gyno issue    Hello,    I am post menopausal, I had some spotting a few months and saw Dr Brown. She told me if it happens aging I should see her. I have had more spotting. Dr. LAI’s next available is in July. Can I get in earlier?

## 2024-04-05 NOTE — TELEPHONE ENCOUNTER
Pt seen for post-menopausal bleeding Jan 2024. Jan notes stated if US abnormal, would have pt return for Endosee/possible EMBX.  Pelvic US done 2/8 was neg.   Pt states spotting has returned.    To [facility] to advise on next steps at this time.

## 2024-04-05 NOTE — TELEPHONE ENCOUNTER
Pt called and assisted with scheduling Endosee w/poss EMBX. Cytotec and Aleve preps given. Pt aware [facility] on-call and appt subject to change.

## 2024-04-05 NOTE — TELEPHONE ENCOUNTER
Since occurring still, would do endosee with possible biopsy to see if polyp present that was missed on u/s-- can do on call 1-3 pm to get her in faster

## 2024-04-22 ENCOUNTER — OFFICE VISIT (OUTPATIENT)
Dept: OBGYN CLINIC | Facility: CLINIC | Age: 59
End: 2024-04-22
Payer: COMMERCIAL

## 2024-04-22 VITALS
HEART RATE: 78 BPM | BODY MASS INDEX: 22 KG/M2 | DIASTOLIC BLOOD PRESSURE: 70 MMHG | WEIGHT: 138.38 LBS | SYSTOLIC BLOOD PRESSURE: 107 MMHG

## 2024-04-22 DIAGNOSIS — N95.0 POSTMENOPAUSAL BLEEDING: Primary | ICD-10-CM

## 2024-04-22 PROCEDURE — 3074F SYST BP LT 130 MM HG: CPT | Performed by: OBSTETRICS & GYNECOLOGY

## 2024-04-22 PROCEDURE — 58558 HYSTEROSCOPY BIOPSY: CPT | Performed by: OBSTETRICS & GYNECOLOGY

## 2024-04-22 PROCEDURE — 3078F DIAST BP <80 MM HG: CPT | Performed by: OBSTETRICS & GYNECOLOGY

## 2024-04-22 NOTE — PROCEDURES
Endosee Procedure     LMP: none  Birth control method(s) used: Postmenopausal    Consent signed.    Procedure discussed with the patient in detail including indication, risks, benefits, alternatives and complications.    Indication:   bleed    Procedure:   operative hysteroscopy with endometrial biopsy    Speculum placed in the vagina.  Betadine wash of vagina and cervix performed.  Single tooth tenaculum was placed at the 12 o'clock position.  Endocervical dilator placed within cervical canal until slight resistance bypassed and left in place.  Dilator exchanged for Endosee catheter.  Camera attached.  Assistant injected 1 cc / sec slowly allowing visualization of endometrial cavity.  Fallopian tubes os seen bilaterally.  Findings: no mass  The fluid was withdrawn from cavity while camera was removed.  Endometrial biopsy performed with 2 passes -- sent to path.  Single tooth tenaculum removed.  Silver nitrate used to achieve hemostasis.  Good hemostasis noted.  Patient tolerated procedure well.      Visit Plan:  No Rx if neg.  Consider coconut oil

## 2024-05-01 ENCOUNTER — HOSPITAL ENCOUNTER (OUTPATIENT)
Dept: MAMMOGRAPHY | Age: 59
Discharge: HOME | End: 2024-05-01
Attending: OBSTETRICS & GYNECOLOGY
Payer: COMMERCIAL

## 2024-05-01 DIAGNOSIS — Z12.31 ENCOUNTER FOR SCREENING MAMMOGRAM FOR MALIGNANT NEOPLASM OF BREAST: ICD-10-CM

## 2024-05-01 PROCEDURE — 77067 SCR MAMMO BI INCL CAD: CPT | Performed by: OBSTETRICS & GYNECOLOGY

## 2024-05-01 PROCEDURE — 77063 BREAST TOMOSYNTHESIS BI: CPT | Performed by: OBSTETRICS & GYNECOLOGY

## 2024-06-14 NOTE — TELEPHONE ENCOUNTER
From: Fabi Little  To: Suad Valladares MD  Sent: 3/9/2021 3:40 AM CST  Subject: Other    Hello, I have been diagnosed with copd. I believe that puts me in the 1B category. Do I need to register to get in line for a shot through  [employer] [employer]?     Thanks,  T show

## 2024-09-06 ENCOUNTER — TELEPHONE (OUTPATIENT)
Facility: CLINIC | Age: 59
End: 2024-09-06

## 2024-09-06 NOTE — TELEPHONE ENCOUNTER
6/8/2021    Andrew Enciso MD  [address] [place] [address]      RE:  Therapy Progress Report    Dear Dr. Andrew Enciso MD:    Your patient, Georgina Gottlieb, has been in therapy for left shoulder.  At this point she continues to need therapy for left shoulder.  Her plan of care from today's visit is:    Occupational Therapy     Referred by: Andrew Enciso MD; Medical Diagnosis (from order):    Diagnosis Information      Diagnosis    719.41 (ICD-9-CM) - M25.512 (ICD-10-CM) - Left shoulder pain    V45.89 (ICD-9-CM) - Z98.890 (ICD-10-CM) - S/P rotator cuff repair                Progress Note    Visit:  14   Patient alert and oriented X3.    SUBJECTIVE                                                                                                             Patient reports pain at best 0/10 and at worst 4/10. Patient reports a decrease in the overall frequency and intensity of pain.     Current functional limitations: Patient reports that she is 75% better overall. She reports that her main limitation in pain and decreased strength.     OBJECTIVE                                                                                                                     Range of Motion (ROM)   (degrees unless noted; active unless noted; norms in ( ); negative=lacking to 0, positive=beyond 0)   Shoulder:     - Flexion (180):        • Left: 165 pain  Passive: 170     - Abduction (180):        • Left: 158 pain  Passive: 180     - Internal Rotation at 90° (70-90):         • Left: 67 pain Passive: 60    - External Rotation at 90° (90):         • Left: 56 pain Passive: 100    Strength  (out of 5 unless noted, standard test position unless noted, lbs tested with hand held dynamometer)   Shoulder:     - Flexion:         • Left: 3+, pain     - Abduction:        • Left: 3+, pain    - Internal Rotation:          • Left (at 90°): 4    - External Rotation:         • Left (at 90°): 3+      Outcome  Recall reminder letter mailed out to patient.     Measures:   Quick Disabilities of the Arm, Shoulder and Hand: QuickDash Total Score: 25  (scored 0-100; a higher score indicates greater disability) see flowsheet for additional documentation    ASSESSMENT                                                                                                             Patient is making good gains in all areas at this time and is progressing well overall. Patient continues to be limited at this time due to decreased range of motion, decreased strength and increased pain. Patient continues to experience pain and is limited in pain-free A/PROM necessitating the need for continued skilled therapy. Continued skilled occupational therapy services warranted to maximize function, increase independence and to facilitate a return to prior level of functioning.       PLAN                                                                                                                           Updates to plan of care: continue current plan of care    GOALS                                                                                                                           Decrease pain/symptoms to 2/10  Improve involved strength to 4+  Improve involved A/PROM for shoulder flexion and abduction 160 degrees, ER 80 degrees and IR 60 degrees.   The above improvements in impairments to assist in obtaining goals listed below  Long Term Goals: to be met by end of plan of care  1. Patient will  and lift a light grocery bag,  steering wheel, perform light home/yard maintenance tasks with reported manageable/tolerable pain Status: Progressing toward goal  2. Patient will complete independent upper body dressing with reported manageable/tolerable difficulty Status:Progressing toward goal  3. Patient will reach overhead, out to side and behind back with reported manageable/tolerable difficulty for cook/eat a meal Status: Progressing toward goal  4. Patient will be independent with  progressed and modified home exercise program.  Status: Progressing toward goal  5. Quick DASH: Patient will complete form to reflect an improved calculated score to less than or equal to 20 to indicate patient reported improvement in function/disability/impairment. (minimal clinically important difference = 15.91) Status: Progressing toward goal  6. Patient will sleep 6 hours without disruption from pain/difficulty with positional changes.  Status: Progressing toward goal    At this time, it appears that she may need 8 more visits to continue her therapy program.  If you concur with the care plan and the additional visits, please complete the attached form and [facility]. If you have questions regarding Ms. Gottlieb's program, please feel free to contact me.      Eliezer Donahue, OTR, CHT, COMT, CLT, C-PS  [facility] [place] [id number] [address] [address]  Phone: [phone]  Fax: [phone]      Georgina Gottlieb   [id number]      Encounter date:  6/8/2021    RE:  Therapy Progress Report    I have reviewed the progress reports for Georgina Gottlieb as her therapy continues.      _________________________________                     _________________  MD Signature (Dr. Andrew Enciso MD )                           Date

## 2024-09-06 NOTE — TELEPHONE ENCOUNTER
----- Message from Fay CHAUDHARI sent at 2019  4:31 PM CST -----  Regardin year CLN recall  Per Dr. De Los Santos patient due for repeat CLN in 5 years.

## (undated) DEVICE — MEDI-VAC NON-CONDUCTIVE SUCTION TUBING 6MM X 1.8M (6FT.) L: Brand: CARDINAL HEALTH

## (undated) DEVICE — SUTURE VICRYL 3-0 SH

## (undated) DEVICE — MINOR GENERAL: Brand: MEDLINE INDUSTRIES, INC.

## (undated) DEVICE — Device: Brand: CUSTOM PROCEDURE KIT

## (undated) DEVICE — 12 ML SYRINGE LUER-LOCK TIP: Brand: MONOJECT

## (undated) DEVICE — Device: Brand: JELCO

## (undated) DEVICE — LINE MNTR ADLT SET O2 INTMD

## (undated) DEVICE — DRAPE PACK CHEST & U BAR

## (undated) DEVICE — Device: Brand: DEFENDO AIR/WATER/SUCTION AND BIOPSY VALVE

## (undated) DEVICE — GAUZE SPONGES,12 PLY: Brand: CURITY

## (undated) DEVICE — ADHESIVE MASTISOL 2/3CC VL

## (undated) DEVICE — SUTURE PDS II 3-0 Z683G

## (undated) DEVICE — FLEXIBLE YANKAUER,MEDIUM TIP, NO VACUUM CONTROL: Brand: ARGYLE

## (undated) DEVICE — SNARE CAPTIFLEX MICRO-OVL OLY

## (undated) DEVICE — SNARE OPTMZ PLPCTM TRP

## (undated) DEVICE — DRAPE TAPE: Brand: CONVERTORS

## (undated) DEVICE — UNDYED BRAIDED (POLYGLACTIN 910), SYNTHETIC ABSORBABLE SUTURE: Brand: COATED VICRYL

## (undated) DEVICE — STERILE LATEX POWDER-FREE SURGICAL GLOVESWITH NITRILE COATING: Brand: PROTEXIS

## (undated) DEVICE — SUTURE SILK 2-0 685H

## (undated) DEVICE — E-Z CLEAN, NON-STICK, PTFE COATED, ELECTROSURGICAL BLADE ELECTRODE, MODIFIED EXTENDED INSULATION, 2.5 INCH (6.35 CM): Brand: MEGADYNE

## (undated) DEVICE — CLIP SM INTNL HMCLP TNTLM ESCP

## (undated) DEVICE — 35 ML SYRINGE REGULAR TIP: Brand: MONOJECT

## (undated) DEVICE — 3M™ STERI-STRIP™ REINFORCED ADHESIVE SKIN CLOSURES, R1547, 1/2 IN X 4 IN (12 MM X 100 MM), 6 STRIPS/ENVELOPE: Brand: 3M™ STERI-STRIP™

## (undated) DEVICE — CLIP MED INTNL HMCLP TNTLM

## (undated) DEVICE — SOL  .9 1000ML BTL

## (undated) NOTE — MR AVS SNAPSHOT
[place]  [address]  [phone]               Thank you for choosing us for your health care visit with Milton Nelson MD.  We are glad to serve you and happy to provide you with this summar discharge instructions in [facility] by going to Visits < Admission Summaries. If you've been to the Emergency Department or your doctor's office, you can view your past visit information in [facility]cloud[facility] by going to Visits < Visit Summaries. Videum questions?

## (undated) NOTE — LETTER
June 24, 2019    Thompson Hernandez, [address] [facility]     Patient: Faina Fox   YOB: 1965   Date of Visit: 6/24/2019       Dear Dr. Ruddy Jacobs MD:    Thank you for referring Suzanne Cmaacho to me for evaluation.  He • Glaucoma Neg    • Macular degeneration Neg        Social History: Social History    Tobacco Use      Smoking status: Current Every Day Smoker        Packs/day: 0.25        Years: 35.00        Pack years: 8.75        Types: Cigarettes      Smokeless tobac Anterior Chamber Deep and quiet Deep and quiet    Iris Normal Normal            Refraction     Wearing Rx       Sphere Cylinder Axis Add    Right -10.25 +3.25 098 2.50    Left -10.25 +2.50 085 2.50    Type:  Progressive bifocal                 ASSESSMENT/

## (undated) NOTE — LETTER
[address] [place] [address] Rd, [place], [place]     AUTHORIZATION FOR SURGICAL OPERATION OR PROCEDURE    1.  I hereby authorize                 , my Physician(s) and whomever may be designated as the doctor's Assistant, to perform the followi 5. I consent to the photographing of procedure(s) to be performed for the purposes of advancing medicine, science and/or education, provided my identity is not revealed.  If the procedure has been videotaped, the physician/surgeon will obtain the original v (Witness signature)                                                                                                  (Date)                                (Time)  STATEMENT OF PHYSICIAN My signature below affirms that prior to the time of the procedure;  I

## (undated) NOTE — LETTER
[address]   Date:   11/9/2022     Name:   Demario Lam    YOB: 1965   MRN:   [id number]       WHERE IS YOUR PAIN NOW? Heidi the areas on your body where you feel the described sensations. Use the appropriate symbol. Cherylene Bloodgood the areas of radiation. Include all affected areas. Just to complete the picture, please draw in the face. ACHE:  ^ ^ ^   NUMBNESS:  0000   PINS & NEEDLES:  = = = =                              ^ ^ ^                       0000              = = = =                                    ^ ^ ^                       0000            = = = =      BURNING:  XXXX   STABBING: ////                  XXXX                ////                         XXXX          ////     Please heidi the line below indicating your degree of pain right now  with 0 being no pain 10 being the worst pain possible.                                          0             1             2              3             4              5              6              7             8             9             10         Patient Signature:

## (undated) NOTE — LETTER
AUTHORIZATION FOR SURGICAL OPERATION OR OTHER PROCEDURE    1. I hereby authorize Dr. Thien Welch, and [address] [place] [address] staff assigned to my case to perform the following operation and/or procedure at the [address] [place] [address]:    Endometrial Biopsy    2.   My Relationship to Patient:           []  Parent    Responsible person                          []  Spouse  In case of minor or                    [] Other  _____________   Incompetent name:  __________________________________________________

## (undated) NOTE — LETTER
AUTHORIZATION FOR SURGICAL OPERATION OR OTHER PROCEDURE    1. I hereby authorize Dr. ROSAS, and [facility] staff assigned to my case to perform the following operation and/or procedure at the [facility] site:    _______________________________________________________________________________________________    ENDOSEE WITH POSSIBLE ENDOMETRIAL BIOPSY  _______________________________________________________________________________________________    2.  My physician has explained the nature and purpose of the operation or other procedure, possible alternative methods of treatment, the risks involved, and the possibility of complication to me.  I acknowledge that no guarantee has been made as to the result that may be obtained.  3.  I recognize that, during the course of this operation, or other procedure, unforseen conditions may necessitate additional or different procedure than those listed above.  I, therefore, further authorize and request that the above named physician, his/her physician assistants or designees perform such procedures as are, in his/her professional opinion, necessary and desirable.  4.  Any tissue or organs removed in the operation or other procedure may be disposed of by and at the discretion of the [facility] and [facility].  5.  I understand that in the event of a medical emergency, I will be transported by local paramedics to [facility] or other hospital emergency department.  6.  I certify that I have read and fully understand the above consent to operation and/or other procedure.    7.  I acknowledge that my physician has explained sedation/analgesia administration to me including the risks and benefits.  I consent to the administration of sedation/analgesia as may be necessary or desirable in the judgement of my physician.    Witness signature: ___________________________________________________ Date:   ______/______/_____                    Time:  ________ A.M.  P.M.       Patient Name:  ______________________________________________________  (please print)      Patient signature:  ___________________________________________________             Relationship to Patient:           []  Parent    Responsible person                          []  Spouse  In case of minor or                    [] Other  _____________   Incompetent name:  __________________________________________________                               (please print)      _____________      Responsible person  In case of minor or  Incompetent signature:  _______________________________________________    Statement of Physician  My signature below affirms that prior to the time of the procedure, I have explained to the patient and/or his/her guardian, the risks and benefits involved in the proposed treatment and any reasonable alternative to the proposed treatment.  I have also explained the risks and benefits involved in the refusal of the proposed treatment and have answered the patient's questions.                        Date:  ______/______/_______  Provider                      Signature:  __________________________________________________________       Time:  ___________ A.M    P.M.

## (undated) NOTE — ED AVS SNAPSHOT
Christ Marin   MRN: [id number]    Department:  [facility] Emergency Department   Date of Visit:  6/22/2019           Disclosure     Insurance plans vary and the physician(s) referred by the ER may not be covered by your plan.  Please contac CARE PHYSICIAN AT ONCE OR RETURN IMMEDIATELY TO THE EMERGENCY DEPARTMENT. If you have been prescribed any medication(s), please fill your prescription right away and begin taking the medication(s) as directed.   If you believe that any of the medications

## (undated) NOTE — LETTER
[facility]  Administration of Anesthesia  1. I, Sergey Allen, or _________________________________ acting on her behalf, (Patient) (Dependent/Representative) request to receive anesthesia for my pending procedure/operation/treatment. infections, high spinal block, spinal bleeding, seizure, cardiac arrest and death. 7. AWARENESS: I understand that it is possible (but unlikely) to have explicit memory of events from the operating room while under general anesthesia.   8. ELECTROCONVULSIV (Date) (Time)                                                                                               (Responsible person in case of minor/ unconscious pt) /Relationship    My signature below affirms that prior to the time of the procedure, I have ex

## (undated) NOTE — MR AVS SNAPSHOT
[place]  [address]  [phone]               Thank you for choosing us for your health care visit with [facility], [facility].   We are glad to serve you and happy to provide you with this summary of

## (undated) NOTE — LETTER
[address] [place] [address] Rd, [place], [place]     AUTHORIZATION FOR SURGICAL OPERATION OR PROCEDURE    1.  I hereby authorize Dr. Kendall Valverde MD, my Physician(s) and whomever may be designated as the doctor's Assistant, to perform th 5. I consent to the photographing of procedure(s) to be performed for the purposes of advancing medicine, science and/or education, provided my identity is not revealed.  If the procedure has been videotaped, the physician/surgeon will obtain the original v (Witness signature)                                                                                                  (Date)                                (Time)  STATEMENT OF PHYSICIAN My signature below affirms that prior to the time of the procedure;  I

## (undated) NOTE — LETTER
9/6/2024    [facility] Lombardo        [address] [facility] [address]            Dear Oralia Lombardo,      Our records indicate that you are due for an appointment for a Colonoscopy with Dewayne De Los Santos MD. Our doctors are booking out about 3-6 months in advance for procedures.     Please call our office to schedule a phone screening appointment to plan for the procedure(s).   Your medical well-being is important to us.    If your insurance requires a referral, please call your primary care office to request one.      Thank you,      The Physicians and Staff at [facility]

## (undated) NOTE — LETTER
[address]ates [address]  [facility], [place], [place]  [address]  INFORMED CONSENT FOR TRANSFUSION OF BLOOD OR BLOOD PRODUCTS   My physician has informed me of the nature, purpose, benefits and risks of transfusion for blood and blood components codi (Signature of Patient)                                                           (Responsible party in case of Minor,                                                                                                Incompetent, or unconscious Patient)  _____

## (undated) NOTE — MR AVS SNAPSHOT
[facility] [address], [address] [place] [address]                    After Visit Summary   6/16/2017    Mark Smith    MRN: [id number]           Visit Information        Provider Department Dept Phone    6/1